# Patient Record
Sex: MALE | Race: WHITE
[De-identification: names, ages, dates, MRNs, and addresses within clinical notes are randomized per-mention and may not be internally consistent; named-entity substitution may affect disease eponyms.]

---

## 2017-10-23 ENCOUNTER — HOSPITAL ENCOUNTER (INPATIENT)
Dept: HOSPITAL 62 - ER | Age: 42
LOS: 4 days | Discharge: HOME | DRG: 330 | End: 2017-10-27
Payer: COMMERCIAL

## 2017-10-23 DIAGNOSIS — Z88.8: ICD-10-CM

## 2017-10-23 DIAGNOSIS — Z23: ICD-10-CM

## 2017-10-23 DIAGNOSIS — Z88.0: ICD-10-CM

## 2017-10-23 DIAGNOSIS — K57.20: Primary | ICD-10-CM

## 2017-10-23 DIAGNOSIS — F17.210: ICD-10-CM

## 2017-10-23 LAB
ALBUMIN SERPL-MCNC: 3.7 G/DL (ref 3.5–5)
ALP SERPL-CCNC: 61 U/L (ref 38–126)
ALT SERPL-CCNC: 31 U/L (ref 21–72)
ANION GAP SERPL CALC-SCNC: 13 MMOL/L (ref 5–19)
APPEARANCE UR: (no result)
AST SERPL-CCNC: 17 U/L (ref 17–59)
BASOPHILS # BLD AUTO: 0.1 10^3/UL (ref 0–0.2)
BASOPHILS NFR BLD AUTO: 0.6 % (ref 0–2)
BILIRUB DIRECT SERPL-MCNC: 0.2 MG/DL (ref 0–0.4)
BILIRUB SERPL-MCNC: 0.6 MG/DL (ref 0.2–1.3)
BILIRUB UR QL STRIP: NEGATIVE
BUN SERPL-MCNC: 10 MG/DL (ref 7–20)
CALCIUM: 9.2 MG/DL (ref 8.4–10.2)
CHLORIDE SERPL-SCNC: 105 MMOL/L (ref 98–107)
CO2 SERPL-SCNC: 25 MMOL/L (ref 22–30)
CREAT SERPL-MCNC: 0.89 MG/DL (ref 0.52–1.25)
EOSINOPHIL # BLD AUTO: 0.2 10^3/UL (ref 0–0.6)
EOSINOPHIL NFR BLD AUTO: 1.5 % (ref 0–6)
ERYTHROCYTE [DISTWIDTH] IN BLOOD BY AUTOMATED COUNT: 12.9 % (ref 11.5–14)
GLUCOSE SERPL-MCNC: 114 MG/DL (ref 75–110)
GLUCOSE UR STRIP-MCNC: NEGATIVE MG/DL
HCT VFR BLD CALC: 41.9 % (ref 37.9–51)
HGB BLD-MCNC: 14.7 G/DL (ref 13.5–17)
HGB HCT DIFFERENCE: 2.2
KETONES UR STRIP-MCNC: NEGATIVE MG/DL
LIPASE SERPL-CCNC: 85.7 U/L (ref 23–300)
LYMPHOCYTES # BLD AUTO: 1.9 10^3/UL (ref 0.5–4.7)
LYMPHOCYTES NFR BLD AUTO: 13.6 % (ref 13–45)
MCH RBC QN AUTO: 32 PG (ref 27–33.4)
MCHC RBC AUTO-ENTMCNC: 35 G/DL (ref 32–36)
MCV RBC AUTO: 92 FL (ref 80–97)
MONOCYTES # BLD AUTO: 1.3 10^3/UL (ref 0.1–1.4)
MONOCYTES NFR BLD AUTO: 9.3 % (ref 3–13)
NEUTROPHILS # BLD AUTO: 10.4 10^3/UL (ref 1.7–8.2)
NEUTS SEG NFR BLD AUTO: 75 % (ref 42–78)
NITRITE UR QL STRIP: NEGATIVE
PH UR STRIP: 7 [PH] (ref 5–9)
POTASSIUM SERPL-SCNC: 4.4 MMOL/L (ref 3.6–5)
PROT SERPL-MCNC: 6.6 G/DL (ref 6.3–8.2)
PROT UR STRIP-MCNC: NEGATIVE MG/DL
RBC # BLD AUTO: 4.58 10^6/UL (ref 4.35–5.55)
SODIUM SERPL-SCNC: 143.1 MMOL/L (ref 137–145)
SP GR UR STRIP: 1
UROBILINOGEN UR-MCNC: NEGATIVE MG/DL (ref ?–2)
WBC # BLD AUTO: 13.8 10^3/UL (ref 4–10.5)

## 2017-10-23 PROCEDURE — 85025 COMPLETE CBC W/AUTO DIFF WBC: CPT

## 2017-10-23 PROCEDURE — 87086 URINE CULTURE/COLONY COUNT: CPT

## 2017-10-23 PROCEDURE — 99285 EMERGENCY DEPT VISIT HI MDM: CPT

## 2017-10-23 PROCEDURE — 81001 URINALYSIS AUTO W/SCOPE: CPT

## 2017-10-23 PROCEDURE — 96360 HYDRATION IV INFUSION INIT: CPT

## 2017-10-23 PROCEDURE — 87040 BLOOD CULTURE FOR BACTERIA: CPT

## 2017-10-23 PROCEDURE — 74176 CT ABD & PELVIS W/O CONTRAST: CPT

## 2017-10-23 PROCEDURE — 88305 TISSUE EXAM BY PATHOLOGIST: CPT

## 2017-10-23 PROCEDURE — 83690 ASSAY OF LIPASE: CPT

## 2017-10-23 PROCEDURE — 80053 COMPREHEN METABOLIC PANEL: CPT

## 2017-10-23 PROCEDURE — 90686 IIV4 VACC NO PRSV 0.5 ML IM: CPT

## 2017-10-23 PROCEDURE — S0028 INJECTION, FAMOTIDINE, 20 MG: HCPCS

## 2017-10-23 PROCEDURE — 36415 COLL VENOUS BLD VENIPUNCTURE: CPT

## 2017-10-23 RX ADMIN — METRONIDAZOLE SCH ML: 500 INJECTION, SOLUTION INTRAVENOUS at 23:52

## 2017-10-23 RX ADMIN — FAMOTIDINE SCH MG: 10 INJECTION INTRAVENOUS at 22:37

## 2017-10-23 RX ADMIN — METRONIDAZOLE SCH ML: 500 INJECTION, SOLUTION INTRAVENOUS at 17:31

## 2017-10-23 RX ADMIN — CIPROFLOXACIN SCH ML: 2 INJECTION, SOLUTION INTRAVENOUS at 22:37

## 2017-10-23 NOTE — ER DOCUMENT REPORT
ED GI/





- General


Chief Complaint: Abdominal Pain


Stated Complaint: LOWER ABDOMINAL PAIN


Time Seen by Provider: 10/23/17 07:15


Mode of Arrival: Ambulatory


Information source: Patient


Notes: 





43 yo smoker, recovering etoh x 75 days, no drugs, male normally healthy c/o  

dull ache low abdominal pain with sharp episodes and bl


oating for 2 weeks.  Normal BM's. suprapubic pressure with urination. Feels 

hard rubber ball below the umbilicus. Feels like he is emptying bladder. No 

clear urine which is unusual for increased water intake. No perineal pain. No 

fever. No abd. surgeries. No vomiting, no nausea. 


TRAVEL OUTSIDE OF THE [place] IN LAST 30 DAYS: No





- Related Data


Allergies/Adverse Reactions: 


 





iodine Allergy (Verified 10/23/17 08:02)


 


Penicillins Allergy (Verified 10/23/17 08:02)


 











Past Medical History





- General


Information source: Patient





- Social History


Smoking Status: Current Every Day Smoker


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


Drug Abuse: None


Lives with: Family


Family History: Reviewed & Not Pertinent


Patient has suicidal ideation: No


Patient has homicidal ideation: No





- Medical History


Medical History: Negative


Renal/ Medical History: Denies: Hx Peritoneal Dialysis


Surgical Hx: Negative





Review of Systems





- Review of Systems


Constitutional: No symptoms reported


EENT: No symptoms reported


Cardiovascular: No symptoms reported


Respiratory: No symptoms reported


Gastrointestinal: No symptoms reported


Genitourinary: See HPI


Male Genitourinary: No symptoms reported


Musculoskeletal: No symptoms reported


Skin: No symptoms reported


Hematologic/Lymphatic: No symptoms reported


Neurological/Psychological: No symptoms reported





Physical Exam





- Vital signs


Vitals: 


 











Temp Pulse Resp BP Pulse Ox


 


 99.0 F   93   18   126/87 H  97 


 


 10/23/17 05:46  10/23/17 05:46  10/23/17 05:46  10/23/17 05:46  10/23/17 05:46











Interpretation: Normal





- General


General appearance: Appears well, Alert


In distress: None





- HEENT


Head: Normocephalic, Atraumatic


Eyes: Normal


Conjunctiva: Normal


Pupils: PERRL


Neck: Supple.  No: Lymphadenopathy





- Respiratory


Respiratory status: No respiratory distress


Chest status: Nontender


Breath sounds: Normal


Chest palpation: Normal





- Cardiovascular


Rhythm: Regular


Heart sounds: Normal auscultation


Murmur: No





- Abdominal


Inspection: Normal


Distension: No distension


Bowel sounds: Normal


Tenderness: Tender - LLQ


Organomegaly: No organomegaly.  No: Hepatomegaly, Splenomegaly





- Back


Back: Normal, Nontender.  No: CVA tenderness





- Extremities


General upper extremity: Normal inspection, Nontender, Normal color, Normal ROM

, Normal temperature


General lower extremity: Normal inspection, Nontender, Normal color, Normal ROM

, Normal temperature, Normal weight bearing.  No: Kristi's sign





- Neurological


Neuro grossly intact: Yes


Cognition: Normal


Orientation: AAOx4


Ghada Coma Scale Eye Opening: Spontaneous


Ghada Coma Scale Verbal: Oriented


Ghada Coma Scale Motor: Obeys Commands


Batson Coma Scale Total: 15


Speech: Normal


Motor strength normal: LUE, RUE, LLE, RLE


Sensory: Normal





- Psychological


Associated symptoms: Normal affect, Normal mood





- Skin


Skin Temperature: Warm


Skin Moisture: Dry


Skin Color: Normal


Skin irregularity: negative: Rash





Course





- Re-evaluation


Re-evalutation: 





10/23/17 08:40


Consult Dr. Parham and patient has a "iodide" allergies she recommended oral 

contrast instead of IV since his BMI is only 28.


10/23/17 12:02


Consult Dr. Edgardo Suero about the 2.7 x 3.2 cm fluid collection abscess and 

surrounding mesenteric inflammation sigmoid colon, diverticulitis.  He will 

admit to his service.  Patient willing to be admitted IVs fluids at 150 an hour 

and antibiotics have been ordered.


10/23/17 12:08








- Vital Signs


Vital signs: 


 











Temp Pulse Resp BP Pulse Ox


 


 99.0 F   86   16   125/78   98 


 


 10/23/17 11:32  10/23/17 11:32  10/23/17 11:32  10/23/17 11:32  10/23/17 11:32














- Laboratory


Result Diagrams: 


 10/23/17 07:52





 10/23/17 07:52


Laboratory results interpreted by me: 


 











  10/23/17 10/23/17





  07:52 07:52


 


WBC  13.8 H 


 


Absolute Neutrophils  10.4 H 


 


Glucose   114 H














Discharge





- Discharge


Clinical Impression: 


Diverticulitis of intestine with abscess


Qualifiers:


 Diverticulitis site: large intestine Diverticulitis bleeding: without bleeding 

Qualified Code(s): K57.20 - Diverticulitis of large intestine with perforation 

and abscess without bleeding





Condition: Stable


Disposition: ADMITTED AS INPATIENT


Admitting Provider: Surgicalist


Unit Admitted: Surgical Floor


Instructions:  Diverticulitis ([facility])

## 2017-10-23 NOTE — PDOC H&P
History of Present Illness


Admission Date/PCP: 


  10/23/17 12:23





  





Patient complains of: Lower abdominal pain, bloating and gaseousness.


History of Present Illness: 


SABAS HOGUE is a 42 year old male who presents with a 1-1/2 week period of 

bloating and gaseousness, that did not respond to Gas-X, laxatives, or 

cessation of his supplements and vitamins.Of bloating and gaseousness, that did 

not respond to Gas-X, laxatives, or cessation of his supplements and 

vitamins.The patient states that he began having pain, approximately 2-3 days 

ago that was 8-9 out of 10. When seen in the emergency room, he was found to 

have left lower quadrant tenderness and guarding.  However, the CT scan 

revealed a 2.7 x 3.2 cm abscess,along the sigmoid colon, That was not amenable 

to percutaneous drainage, secondary to bowel loops. Surgical referral was just 

made because of the need for sigmoid resection.








Social History


Lives with: Family


Smoking Status: Current Every Day Smoker





- Advance Directive


Resuscitation Status: Full Code





Family History


Family History: Reviewed & Not Pertinent


Parental Family History Reviewed: No


Children Family History Reviewed: No


Sibling(s) Family History Reviewed.: No





Medication/Allergy


Home Medications: 








No Home Medications  10/23/17 








Allergies/Adverse Reactions: 


 





iodine Allergy (Verified 10/23/17 08:02)


 


Penicillins Allergy (Verified 10/23/17 08:02)


 











Physical Exam


Vital Signs: 


 











Temp Pulse Resp BP Pulse Ox


 


 98.1 F   83   18   124/82   97 


 


 10/23/17 13:33  10/23/17 13:33  10/23/17 13:33  10/23/17 13:33  10/23/17 13:33











General appearance: PRESENT: no acute distress, cooperative, well-developed, 

well-nourished


Head exam: PRESENT: atraumatic, normocephalic


Eye exam: PRESENT: conjunctiva pink, EOMI, PERRLA


Mouth exam: PRESENT: moist, neck supple, tongue midline


Neck exam: PRESENT: full ROM.  ABSENT: JVD, lymphadenopathy, tenderness, 

thyromegaly, tracheal deviation


Respiratory exam: PRESENT: clear to auscultation kirby.  ABSENT: crackles, rales, 

tachypnea, unlabored, wheezes


Cardiovascular exam: PRESENT: RRR


GI/Abdominal exam: PRESENT: guarding - Mild, normal bowel sounds, soft, 

tenderness - Left lower quadrant.  ABSENT: rebound, rigid


Rectal exam: PRESENT: deferred


Neurological exam: PRESENT: alert, altered, awake, oriented to person, oriented 

to place, oriented to time, oriented to situation


Psychiatric exam: PRESENT: appropriate affect





Results


Impressions: 


 





Abdomen/Pelvis CT  10/23/17 08:37


IMPRESSION:  Diverticulitis.  Small abscess.


 














Assessment & Plan





- Plan Summary


Plan Summary: 


Given the percutaneous drainage cannot be accomplished, and given that he is 

not obstructed, we will proceed with bowel prep, IV antibiotics, and prepare 

for sigmoid resection with primary anastomosis in the morning.

## 2017-10-23 NOTE — RADIOLOGY REPORT (SQ)
EXAM DESCRIPTION:  CT ABD/PELVIS ORAL ONLY



COMPLETED DATE/TIME:  10/23/2017 11:16 am



REASON FOR STUDY:  LLQ abd pain



COMPARISON:  None.



TECHNIQUE:  CT scan of the abdomen and pelvis performed with oral contrast and no intravenous contras
t. Images reviewed with lung, soft tissue, and bone windows. Reconstructed coronal and sagittal MPR i
mages reviewed. All images stored on PACS.

All CT scanners at this facility use dose modulation, iterative reconstruction, and/or weight based d
osing when appropriate to reduce radiation dose to as low as reasonably achievable (ALARA).

[facility]: Dose Right  [facility]: CareDose    [facility]: Dose Right    [facility]: Teradose 4D    [facility]: Smart Technologies



RADIATION DOSE:  Up-to-date CT equipment and radiation dose reduction techniques were employed. CTDIv
ol: 8.8 mGy. DLP: 481 mGy-cm. mGy.



LIMITATIONS:  None.



FINDINGS:  LOWER CHEST: No significant findings. No nodules or infiltrates.

NON-CONTRASTED LIVER, SPLEEN, ADRENALS: Evaluation limited by lack of IV contrast. No identified sign
ificant masses.

PANCREAS: No masses. No peripancreatic inflammatory changes.

GALLBLADDER: No identified stones by CT criteria. No inflammatory changes to suggest cholecystitis.

RIGHT KIDNEY AND URETER: No suspicious masses. Assessment limited by lack of IV contrast.   No signif
icant calcifications.   No hydronephrosis or hydroureter.

LEFT KIDNEY AND URETER: No suspicious masses. Assessment limited by lack of IV contrast.   No signifi
cant calcifications.   No hydronephrosis or hydroureter.

AORTA AND RETROPERITONEUM: No aneurysm. No retroperitoneal masses or adenopathy.

BOWEL AND PERITONEAL CAVITY: Mesenteric inflammation sigmoid colon.  2.7 x 3.2 cm in diameter fluid c
ollection.  Due to surrounding bowel, this will be difficult to drain percutaneously.  Surgical consu
ltation is recommended.  No ascites or free air.

APPENDIX: Normal.

PELVIS, BLADDER, AND ABDOMINAL WALL: No abnormal pelvic masses. No abdominal wall hernias. Bladder un
remarkable.

BONES: No significant findings.

OTHER: No other significant finding.



IMPRESSION:  Diverticulitis.  Small abscess.



TECHNICAL DOCUMENTATION:  JOB ID:  [id number]

Quality ID # [id number]: Final reports with documentation of one or more dose reduction techniques (e.g., Au
tomated exposure control, adjustment of the mA and/or kV according to patient size, use of iterative 
reconstruction technique)

 2011 [employer]- All Rights Reserved

## 2017-10-24 PROCEDURE — 0DTN0ZZ RESECTION OF SIGMOID COLON, OPEN APPROACH: ICD-10-PCS

## 2017-10-24 RX ADMIN — MORPHINE SULFATE PRN MG: 10 INJECTION INTRAMUSCULAR; INTRAVENOUS; SUBCUTANEOUS at 22:27

## 2017-10-24 RX ADMIN — DEXTROSE, SODIUM CHLORIDE, SODIUM LACTATE, POTASSIUM CHLORIDE, AND CALCIUM CHLORIDE PRN ML: 5; .6; .31; .03; .02 INJECTION, SOLUTION INTRAVENOUS at 22:39

## 2017-10-24 RX ADMIN — METRONIDAZOLE SCH ML: 500 INJECTION, SOLUTION INTRAVENOUS at 05:33

## 2017-10-24 RX ADMIN — ENOXAPARIN SODIUM SCH MG: 40 INJECTION SUBCUTANEOUS at 09:09

## 2017-10-24 RX ADMIN — METRONIDAZOLE SCH ML: 500 INJECTION, SOLUTION INTRAVENOUS at 17:35

## 2017-10-24 RX ADMIN — CIPROFLOXACIN SCH ML: 2 INJECTION, SOLUTION INTRAVENOUS at 09:08

## 2017-10-24 RX ADMIN — FAMOTIDINE SCH MG: 10 INJECTION INTRAVENOUS at 22:28

## 2017-10-24 RX ADMIN — METRONIDAZOLE SCH ML: 500 INJECTION, SOLUTION INTRAVENOUS at 17:50

## 2017-10-24 RX ADMIN — FAMOTIDINE SCH MG: 10 INJECTION INTRAVENOUS at 09:09

## 2017-10-24 RX ADMIN — MORPHINE SULFATE PRN MG: 10 INJECTION INTRAMUSCULAR; INTRAVENOUS; SUBCUTANEOUS at 18:37

## 2017-10-24 RX ADMIN — CIPROFLOXACIN SCH ML: 2 INJECTION, SOLUTION INTRAVENOUS at 22:28

## 2017-10-24 NOTE — OPERATIVE REPORT E
Operative Report



NAME: SABAS HOGUE

MRN:  [id number]          : 1975 AGE:  42Y

DATE OF SURGERY: 10/24/2017              ROOM: [id number]



PREOPERATIVE DIAGNOSIS:

CONTAINED PERFORATED SIGMOID DIVERTICULITIS WITH ABSCESS, NOT AMENABLE TO

PERCUTANEOUS DRAINAGE BY CT GUIDED DRAINAGE.



POSTOPERATIVE DIAGNOSIS:

PERFORATED SIGMOID DIVERTICULITIS WITH SIGMOID PHLEGMON.



OPERATION:

Segmental sigmoid colectomy with primary anastomosis between the

descending colon and the sigmoid colon.



SURGEON:

COMPA HOBSON M.D.



ANESTHESIA:

General



REPLACEMENT:

Crystalloids.



DRAINS:

None.



COMPLICATIONS:

None.



CONDITION:

Stable.



FINDINGS:

Patient was admitted with contained perforated sigmoid diverticulitis with

abscess formation measuring 2.3 x 4.7 cm however at the time of surgery,

patient was found to have a phlegmon of the proximal sigmoid colon without

evidence of an abscess.



PROCEDURE:

The patient was brought to the operating room suite and placed in supine

position on the operating table.  Monitoring devices were attached.  IV

sedation was administered followed by the induction of general

endotracheal anesthesia.  The patient's abdomen was prepped and draped in

the usual sterile manner.  A timeout was achieved.  After all concurred,

the patient's abdomen was marked and a midline incision was made from the

supraumbilical region to the suprapubic region.  The incision was guided

through the skin and subcutaneous tissue down to the linea alba.  The

linea alba was divided as was the peritoneum and the abdominal cavity was

entered.  Upon entering the abdominal cavity, we noted the sigmoid

phlegmon to which a loop of small bowel was adherent and it was also

adherent to the posterior peritoneum.  We then packed away the majority of

the small bowel using laps in the upper abdomen and then we began to

dissect the phlegmon off the posterior peritoneum and dissected loops of

small bowel off the phlegmon.  We did this using blunt and sharp

dissection and significant tissue attachments were divided using the endo

ligature.  We then decided to divide the segment of the mid sigmoid from

the distal sigmoid and a RONY was used to divide the sigmoid.  We then

continued to mobilize the sigmoid and the phlegmon from the posterior

peritoneum and once this was accomplished using the LigaSure, we then

divided the proximal to distal descending colon from the proximal sigmoid

where the phlegmon had formed.  Once this was done, we then mobilized the

descending colon by incising along the white line of Toldt in order to

bring down our descending colon so that a side-to-side anastomosis could

be created between the distal sigmoid and the proximal descending colon. 

We then lined up the bowel side-to-side using 3-0 silk sutures.  Once this

was done, we made openings in each segment and after placing a bowel clamp

on the descending colon, we inserted the RONY-5 stapler creating a

side-to-side anastomosis.  We then closed the opening made by the RONY

using the TA-30 and once this was done, this was oversewn using 3-0 silk. 

The patient tolerated the procedure well.  Sponge and instrument counts

were correct.  The patient was discharged to the PACU in stable condition.













DICTATING PHYSICIAN:  COMPA HOBSON M.D.





[id number]                  DT: 10/24/2017    1641

PHY#: [id number]            DD: 10/24/2017    1448

ID:   [id number]           JOB#: [id number]       ACCT: [id number]



cc:COMPA HOBSON M.D.

>

## 2017-10-24 NOTE — BRIEF OPERATIVE NOTE
BRIEF OPERATIVE REPORT


DATE OF SURGERY: 10/24/17


TIME OF SURGERY: 12:15


PREOPERATIVE DIAGNOSIS: Contained perforated sigmoid diverticulitis with abscess

/phlegmon


POSTOPERATIVE DIAGNOSIS: Same


SURGEON: COMPA HOBSON


FINDINGS: Contained perforated sigmoid diverticulitis with abscess/phlegmon


COMPLICATIONS: 


None





ESTIMATED BLOOD LOSS: 50cc


TISSUE REMOVED OR ALTERED: Segment of sigmoid colon with abscess/phlegmon


TECHNICAL PROCEDURE: See Dictation

## 2017-10-25 LAB
BASOPHILS # BLD AUTO: 0.1 10^3/UL (ref 0–0.2)
BASOPHILS NFR BLD AUTO: 0.5 % (ref 0–2)
EOSINOPHIL # BLD AUTO: 0.1 10^3/UL (ref 0–0.6)
EOSINOPHIL NFR BLD AUTO: 1.1 % (ref 0–6)
ERYTHROCYTE [DISTWIDTH] IN BLOOD BY AUTOMATED COUNT: 12.6 % (ref 11.5–14)
HCT VFR BLD CALC: 37 % (ref 37.9–51)
HGB BLD-MCNC: 13 G/DL (ref 13.5–17)
HGB HCT DIFFERENCE: 2
LYMPHOCYTES # BLD AUTO: 2.2 10^3/UL (ref 0.5–4.7)
LYMPHOCYTES NFR BLD AUTO: 18.1 % (ref 13–45)
MCH RBC QN AUTO: 32.3 PG (ref 27–33.4)
MCHC RBC AUTO-ENTMCNC: 35.1 G/DL (ref 32–36)
MCV RBC AUTO: 92 FL (ref 80–97)
MONOCYTES # BLD AUTO: 0.6 10^3/UL (ref 0.1–1.4)
MONOCYTES NFR BLD AUTO: 5.1 % (ref 3–13)
NEUTROPHILS # BLD AUTO: 9.1 10^3/UL (ref 1.7–8.2)
NEUTS SEG NFR BLD AUTO: 75.2 % (ref 42–78)
RBC # BLD AUTO: 4.02 10^6/UL (ref 4.35–5.55)
WBC # BLD AUTO: 12.1 10^3/UL (ref 4–10.5)

## 2017-10-25 RX ADMIN — MORPHINE SULFATE PRN MG: 10 INJECTION INTRAMUSCULAR; INTRAVENOUS; SUBCUTANEOUS at 20:27

## 2017-10-25 RX ADMIN — CIPROFLOXACIN SCH ML: 2 INJECTION, SOLUTION INTRAVENOUS at 21:33

## 2017-10-25 RX ADMIN — MORPHINE SULFATE PRN MG: 10 INJECTION INTRAMUSCULAR; INTRAVENOUS; SUBCUTANEOUS at 17:30

## 2017-10-25 RX ADMIN — FAMOTIDINE SCH MG: 10 INJECTION INTRAVENOUS at 21:33

## 2017-10-25 RX ADMIN — CIPROFLOXACIN SCH ML: 2 INJECTION, SOLUTION INTRAVENOUS at 09:26

## 2017-10-25 RX ADMIN — MORPHINE SULFATE PRN MG: 10 INJECTION INTRAMUSCULAR; INTRAVENOUS; SUBCUTANEOUS at 09:26

## 2017-10-25 RX ADMIN — METRONIDAZOLE SCH ML: 500 INJECTION, SOLUTION INTRAVENOUS at 11:46

## 2017-10-25 RX ADMIN — KETOROLAC TROMETHAMINE PRN MG: 30 INJECTION, SOLUTION INTRAMUSCULAR at 21:33

## 2017-10-25 RX ADMIN — ENOXAPARIN SODIUM SCH MG: 40 INJECTION SUBCUTANEOUS at 09:26

## 2017-10-25 RX ADMIN — KETOROLAC TROMETHAMINE PRN MG: 30 INJECTION, SOLUTION INTRAMUSCULAR at 03:14

## 2017-10-25 RX ADMIN — METRONIDAZOLE SCH ML: 500 INJECTION, SOLUTION INTRAVENOUS at 01:09

## 2017-10-25 RX ADMIN — METRONIDAZOLE SCH ML: 500 INJECTION, SOLUTION INTRAVENOUS at 06:04

## 2017-10-25 RX ADMIN — METRONIDAZOLE SCH ML: 500 INJECTION, SOLUTION INTRAVENOUS at 17:30

## 2017-10-25 RX ADMIN — FAMOTIDINE SCH MG: 10 INJECTION INTRAVENOUS at 09:26

## 2017-10-25 NOTE — PDOC PROGRESS REPORT
Subjective


Progress Note for:: 10/25/17


Subjective:: 


POD 1 s/p sigmoid colectomy, patient at bedside awaiting removal of pina 

catheter, pain at surgical site. Hungry no flattus. Initiating cliq diet.





Physical Exam


Vital Signs: 


 











Temp Pulse Resp BP Pulse Ox


 


 98.1 F   81   17   113/71   96 


 


 10/25/17 00:49  10/25/17 00:49  10/25/17 00:49  10/25/17 00:49  10/25/17 00:49








 Intake & Output











 10/24/17 10/25/17 10/26/17





 06:59 06:59 06:59


 


Intake Total 5950 58553 


 


Output Total  2700 


 


Balance 5950 25138 


 


Weight 90.718 kg 90.718 kg 











General appearance: PRESENT: no acute distress, cooperative, well-developed, 

well-nourished


Head exam: PRESENT: atraumatic, normocephalic


Eye exam: PRESENT: conjunctiva pink.  ABSENT: conjunctival injection


Mouth exam: PRESENT: moist, tongue midline


Neck exam: ABSENT: lymphadenopathy, thyromegaly


Respiratory exam: PRESENT: symmetrical.  ABSENT: accessory muscle use, tachypnea


Vascular exam: PRESENT: normal capillary refill


GI/Abdominal exam: PRESENT: soft, tenderness - incisional.  ABSENT: distended, 

firm, guarding


Extremities exam: ABSENT: calf tenderness, clubbing, tenderness


Musculoskeletal exam: PRESENT: ambulatory, full ROM


Neurological exam: PRESENT: alert, awake, oriented to person, oriented to place

, oriented to time, CN II-XII grossly intact





Results


Impressions: 


 





Abdomen/Pelvis CT  10/23/17 08:37


IMPRESSION:  Diverticulitis.  Small abscess.


 














Assessment & Plan





- Diagnosis


(1) S/P partial colectomy


Is this a current diagnosis for this admission?: Yes   


Plan: 


Cliq diet, advance diet with bowel function


Ambulation


Pain control


Pulmonary toilet, IS ordered


DVT/GI prophylaxis

## 2017-10-26 LAB
BASOPHILS # BLD AUTO: 0.1 10^3/UL (ref 0–0.2)
BASOPHILS NFR BLD AUTO: 0.9 % (ref 0–2)
EOSINOPHIL # BLD AUTO: 0.3 10^3/UL (ref 0–0.6)
EOSINOPHIL NFR BLD AUTO: 2.7 % (ref 0–6)
ERYTHROCYTE [DISTWIDTH] IN BLOOD BY AUTOMATED COUNT: 12.8 % (ref 11.5–14)
HCT VFR BLD CALC: 35.3 % (ref 37.9–51)
HGB BLD-MCNC: 12.4 G/DL (ref 13.5–17)
HGB HCT DIFFERENCE: 1.9
LYMPHOCYTES # BLD AUTO: 2.1 10^3/UL (ref 0.5–4.7)
LYMPHOCYTES NFR BLD AUTO: 22.6 % (ref 13–45)
MCH RBC QN AUTO: 31.7 PG (ref 27–33.4)
MCHC RBC AUTO-ENTMCNC: 35 G/DL (ref 32–36)
MCV RBC AUTO: 91 FL (ref 80–97)
MONOCYTES # BLD AUTO: 0.8 10^3/UL (ref 0.1–1.4)
MONOCYTES NFR BLD AUTO: 8.1 % (ref 3–13)
NEUTROPHILS # BLD AUTO: 6.1 10^3/UL (ref 1.7–8.2)
NEUTS SEG NFR BLD AUTO: 65.7 % (ref 42–78)
RBC # BLD AUTO: 3.9 10^6/UL (ref 4.35–5.55)
WBC # BLD AUTO: 9.3 10^3/UL (ref 4–10.5)

## 2017-10-26 RX ADMIN — METRONIDAZOLE SCH ML: 500 INJECTION, SOLUTION INTRAVENOUS at 00:22

## 2017-10-26 RX ADMIN — DEXTROSE, SODIUM CHLORIDE, SODIUM LACTATE, POTASSIUM CHLORIDE, AND CALCIUM CHLORIDE PRN ML: 5; .6; .31; .03; .02 INJECTION, SOLUTION INTRAVENOUS at 04:06

## 2017-10-26 RX ADMIN — FAMOTIDINE SCH MG: 10 INJECTION INTRAVENOUS at 21:09

## 2017-10-26 RX ADMIN — MORPHINE SULFATE PRN MG: 10 INJECTION INTRAMUSCULAR; INTRAVENOUS; SUBCUTANEOUS at 07:05

## 2017-10-26 RX ADMIN — CIPROFLOXACIN SCH ML: 2 INJECTION, SOLUTION INTRAVENOUS at 21:09

## 2017-10-26 RX ADMIN — KETOROLAC TROMETHAMINE PRN MG: 30 INJECTION, SOLUTION INTRAMUSCULAR at 21:08

## 2017-10-26 RX ADMIN — MORPHINE SULFATE PRN MG: 10 INJECTION INTRAMUSCULAR; INTRAVENOUS; SUBCUTANEOUS at 18:20

## 2017-10-26 RX ADMIN — MORPHINE SULFATE PRN MG: 10 INJECTION INTRAMUSCULAR; INTRAVENOUS; SUBCUTANEOUS at 14:35

## 2017-10-26 RX ADMIN — ENOXAPARIN SODIUM SCH MG: 40 INJECTION SUBCUTANEOUS at 09:19

## 2017-10-26 RX ADMIN — DEXTROSE, SODIUM CHLORIDE, SODIUM LACTATE, POTASSIUM CHLORIDE, AND CALCIUM CHLORIDE PRN ML: 5; .6; .31; .03; .02 INJECTION, SOLUTION INTRAVENOUS at 17:07

## 2017-10-26 RX ADMIN — METRONIDAZOLE SCH ML: 500 INJECTION, SOLUTION INTRAVENOUS at 17:06

## 2017-10-26 RX ADMIN — METRONIDAZOLE SCH ML: 500 INJECTION, SOLUTION INTRAVENOUS at 05:41

## 2017-10-26 RX ADMIN — METRONIDAZOLE SCH ML: 500 INJECTION, SOLUTION INTRAVENOUS at 11:26

## 2017-10-26 RX ADMIN — CIPROFLOXACIN SCH ML: 2 INJECTION, SOLUTION INTRAVENOUS at 09:23

## 2017-10-26 RX ADMIN — KETOROLAC TROMETHAMINE PRN MG: 30 INJECTION, SOLUTION INTRAMUSCULAR at 09:23

## 2017-10-26 RX ADMIN — FAMOTIDINE SCH MG: 10 INJECTION INTRAVENOUS at 09:22

## 2017-10-26 NOTE — PDOC PROGRESS REPORT
Subjective


Progress Note for:: 10/26/17


Subjective:: 





No events overnight


Pain controlled


Tolerating a liquid diet, adding po pain meds


awaiting bowel function


Patient ambulatory after pina out yesturday





Physical Exam


Vital Signs: 


 











Temp Pulse Resp BP Pulse Ox


 


 97.6 F   76   18   114/72   96 


 


 10/25/17 23:33  10/25/17 23:33  10/25/17 23:33  10/25/17 23:33  10/25/17 23:33








 Intake & Output











 10/25/17 10/26/17 10/27/17





 06:59 06:59 06:59


 


Intake Total 29154 4435 


 


Output Total 2700 600 


 


Balance 53769 3835 


 


Weight 90.718 kg 94.8 kg 











General appearance: PRESENT: no acute distress, well-developed, well-nourished


Head exam: PRESENT: atraumatic, normocephalic


Eye exam: PRESENT: conjunctiva pink


Mouth exam: PRESENT: moist


Neck exam: ABSENT: lymphadenopathy, thyromegaly, tracheal deviation


Respiratory exam: PRESENT: symmetrical, unlabored.  ABSENT: tachypnea


Vascular exam: PRESENT: normal capillary refill


GI/Abdominal exam: PRESENT: soft, tenderness - Incisional, incision C/D/I.  

ABSENT: distended, firm


Extremities exam: ABSENT: calf tenderness, tenderness


Neurological exam: PRESENT: alert, awake, oriented to person, oriented to place

, oriented to time, CN II-XII grossly intact





Results


Laboratory Results: 


 





 10/25/17 10:44 





 











  10/25/17





  10:44


 


WBC  12.1 H


 


RBC  4.02 L


 


Hgb  13.0 L


 


Hct  37.0 L


 


MCV  92


 


MCH  32.3


 


MCHC  35.1


 


RDW  12.6


 


Plt Count  195


 


Seg Neutrophils %  75.2


 


Lymphocytes %  18.1


 


Monocytes %  5.1


 


Eosinophils %  1.1


 


Basophils %  0.5


 


Absolute Neutrophils  9.1 H


 


Absolute Lymphocytes  2.2


 


Absolute Monocytes  0.6


 


Absolute Eosinophils  0.1


 


Absolute Basophils  0.1











Impressions: 


 





Abdomen/Pelvis CT  10/23/17 08:37


IMPRESSION:  Diverticulitis.  Small abscess.


 














Assessment & Plan





- Diagnosis


(1) S/P partial colectomy


Is this a current diagnosis for this admission?: Yes   


Plan: 


Cliq diet, maintain, awaiting bowel function


Ambulation


Pain control, adding oral pain meds


Pulmonary toilet, using IS 10x/hr


DVT/GI prophylaxis

## 2017-10-27 VITALS — SYSTOLIC BLOOD PRESSURE: 137 MMHG | DIASTOLIC BLOOD PRESSURE: 82 MMHG

## 2017-10-27 LAB
BASOPHILS # BLD AUTO: 0.1 10^3/UL (ref 0–0.2)
BASOPHILS NFR BLD AUTO: 0.8 % (ref 0–2)
EOSINOPHIL # BLD AUTO: 0.3 10^3/UL (ref 0–0.6)
EOSINOPHIL NFR BLD AUTO: 3.3 % (ref 0–6)
ERYTHROCYTE [DISTWIDTH] IN BLOOD BY AUTOMATED COUNT: 13 % (ref 11.5–14)
HCT VFR BLD CALC: 37.3 % (ref 37.9–51)
HGB BLD-MCNC: 12.9 G/DL (ref 13.5–17)
HGB HCT DIFFERENCE: 1.4
LYMPHOCYTES # BLD AUTO: 1.5 10^3/UL (ref 0.5–4.7)
LYMPHOCYTES NFR BLD AUTO: 17.5 % (ref 13–45)
MCH RBC QN AUTO: 31.3 PG (ref 27–33.4)
MCHC RBC AUTO-ENTMCNC: 34.5 G/DL (ref 32–36)
MCV RBC AUTO: 91 FL (ref 80–97)
MONOCYTES # BLD AUTO: 0.8 10^3/UL (ref 0.1–1.4)
MONOCYTES NFR BLD AUTO: 8.8 % (ref 3–13)
NEUTROPHILS # BLD AUTO: 6 10^3/UL (ref 1.7–8.2)
NEUTS SEG NFR BLD AUTO: 69.6 % (ref 42–78)
RBC # BLD AUTO: 4.1 10^6/UL (ref 4.35–5.55)
WBC # BLD AUTO: 8.7 10^3/UL (ref 4–10.5)

## 2017-10-27 PROCEDURE — 3E0234Z INTRODUCTION OF SERUM, TOXOID AND VACCINE INTO MUSCLE, PERCUTANEOUS APPROACH: ICD-10-PCS

## 2017-10-27 RX ADMIN — ENOXAPARIN SODIUM SCH MG: 40 INJECTION SUBCUTANEOUS at 10:17

## 2017-10-27 RX ADMIN — KETOROLAC TROMETHAMINE PRN MG: 30 INJECTION, SOLUTION INTRAMUSCULAR at 05:18

## 2017-10-27 RX ADMIN — METRONIDAZOLE SCH ML: 500 INJECTION, SOLUTION INTRAVENOUS at 00:57

## 2017-10-27 RX ADMIN — METRONIDAZOLE SCH ML: 500 INJECTION, SOLUTION INTRAVENOUS at 05:18

## 2017-10-27 RX ADMIN — CIPROFLOXACIN SCH ML: 2 INJECTION, SOLUTION INTRAVENOUS at 10:14

## 2017-10-27 RX ADMIN — METRONIDAZOLE SCH ML: 500 INJECTION, SOLUTION INTRAVENOUS at 12:34

## 2017-10-27 RX ADMIN — DEXTROSE, SODIUM CHLORIDE, SODIUM LACTATE, POTASSIUM CHLORIDE, AND CALCIUM CHLORIDE PRN ML: 5; .6; .31; .03; .02 INJECTION, SOLUTION INTRAVENOUS at 05:04

## 2017-10-27 RX ADMIN — FAMOTIDINE SCH MG: 10 INJECTION INTRAVENOUS at 10:13

## 2017-10-27 NOTE — PDOC PROGRESS REPORT
Subjective


Progress Note for:: 10/27/17


Subjective:: 


Patient tolerating a diet


Minimal pain associated with incision


Passing flattus/BM per patient





Physical Exam


Vital Signs: 


 











Temp Pulse Resp BP Pulse Ox


 


 97.3 F   62   14   140/89 H  98 


 


 10/27/17 07:20  10/27/17 07:20  10/27/17 07:20  10/27/17 07:20  10/27/17 07:20








 Intake & Output











 10/26/17 10/27/17 10/28/17





 06:59 06:59 06:59


 


Intake Total 4435 4885 


 


Output Total 600  


 


Balance 3835 4885 


 


Weight 94.8 kg 96.3 kg 











General appearance: PRESENT: no acute distress


Head exam: PRESENT: atraumatic, normocephalic


Eye exam: PRESENT: conjunctiva pink


Mouth exam: PRESENT: moist, neck supple


Neck exam: ABSENT: lymphadenopathy, tenderness, thyromegaly


Respiratory exam: PRESENT: symmetrical, unlabored.  ABSENT: tachypnea


Vascular exam: PRESENT: normal capillary refill


GI/Abdominal exam: PRESENT: soft, tenderness - incisional pain.  ABSENT: 

distended, firm


Extremities exam: ABSENT: calf tenderness, pedal edema


Neurological exam: PRESENT: alert, awake, oriented to person, oriented to place

, oriented to time, CN II-XII grossly intact





Results


Laboratory Results: 


 





 10/26/17 10:23 





 











  10/26/17





  10:23


 


WBC  9.3


 


RBC  3.90 L


 


Hgb  12.4 L


 


Hct  35.3 L


 


MCV  91


 


MCH  31.7


 


MCHC  35.0


 


RDW  12.8


 


Plt Count  197


 


Seg Neutrophils %  65.7


 


Lymphocytes %  22.6


 


Monocytes %  8.1


 


Eosinophils %  2.7


 


Basophils %  0.9


 


Absolute Neutrophils  6.1


 


Absolute Lymphocytes  2.1


 


Absolute Monocytes  0.8


 


Absolute Eosinophils  0.3


 


Absolute Basophils  0.1











Impressions: 


 





Abdomen/Pelvis CT  10/23/17 08:37


IMPRESSION:  Diverticulitis.  Small abscess.


 














Assessment & Plan





- Diagnosis


(1) S/P partial colectomy


Is this a current diagnosis for this admission?: Yes   


Plan: 


Advance diet


With tolerance home today


Home on oral antibiotics

## 2017-10-27 NOTE — PDOC DISCHARGE SUMMARY
Discharge Summary (SDC)





- Discharge


Final Diagnosis: 


Diverticulitis, s/p sigmoidectomy


Date of Surgery: 10/24/17


Discharge Date: 10/27/17


Condition: Good


Forms:  Post Operative


Treatment or Instructions: 


Fiber supplementation of diet, 7-10g daily to help with diverticulosis 

development and prevent diverticulitis


Oral antibiotic course for 14 days


Prescriptions: 


Ciprofloxacin HCl [Cipro 500 mg Tablet] 500 mg PO BID #20 tablet


Hydrocodone/Acetaminophen [Norco 5-325 mg Tablet] 2 tab PO Q6HP PRN #20 tablet


 PRN Reason: 


Metronidazole [Flagyl 500 mg Tablet] 500 mg PO TID #30 tablet


Referrals: 


[facility] [Provider Group]


Respiratory Treatments at Home: Deep Breathing/Coughing, Incentive Spirometer


Discharge Activity: No Lifting Over 10 Pounds - x4 weeks


Home Care Assistance: None Needed


Report the Following to Your Physician Immediately: Shortness of Breath, Nausea

, Vomiting, Increase in Pain, Signs of Hyperglycemia, Signs of Hypoglycemia, 

Yellow Skin, Fever over 101 Degrees, Unusual Bleeding, Redness, Swelling, Warmth

, Increased Soreness, Drainage-Yellow, Drainage-Gray, Drainage-Green, Drainage-

Foul Smelling, Increased Vaginal Bleed, Large Clots, Numbness, Tingling 

Sensation, Visual Disturbance, Weight Gain 2-3lbs a day, Weight Gain 3-5lbs a 

week, Wheezing, Seizure, IV Site Infection Signs, Urinary Infection Signs

## 2017-12-01 NOTE — PDOC DISCHARGE SUMMARY
General





- Admit/Disc Date/PCP


Admission Date/Primary Care Provider: 


  10/23/17 14:38





  





Discharge Date: 10/27/17





- Discharge Diagnosis


(1) S/P partial colectomy


Is this a current diagnosis for this admission?: Yes   





- Additional Information


Resuscitation Status: Full Code


Discharge Diet: Other (Comments)


Discharge Activity: No Lifting Over 10 Pounds


Home Medications: 








Ciprofloxacin HCl [Cipro 500 mg Tablet] 500 mg PO BID #20 tablet 10/27/17 


Hydrocodone/Acetaminophen [Norco 5-325 mg Tablet] 2 tab PO Q6HP PRN #20 tablet 

10/27/17 


Metronidazole [Flagyl 500 mg Tablet] 500 mg PO TID #30 tablet 10/27/17 











History of Present Illness


Patient complains of: Abdominal pain


History of Present Illness: 


SABAS HOGUE is a 42 year old male admitted on 10/23/17 for contained sigmoid 

perforation. Innability to percutaneously drain the site drove decision to 

perform sigmoid colectomy by Dr Alarcon. Patient was hospitalized postoperatively 

for recovery after sigmoidectomy. Uneventful postoperative course with 

progression of tolerance of diet and pain control allowed patient discharge 

home on POD 5. At discharge patient was stable. Instructions were given to the 

patient for weight restriction of 10-lbs or less, pain control options 

including narcotics orally. Instructions were given to the patient to call for 

followup with general surgery at [facility] for follow up in 7-10 

days after discharge home.





Final diagnosis was sigmoid diverticulitis s/p sigmoidectomy with primary 

anastamosis.





Hospital Course


Hospital Course: 


Uneventful hospital course. Progression and recovery as anticipated.





Physical Exam


Vital Signs: 


 











Temp Pulse Resp BP Pulse Ox


 


 97.5 F   75   16   137/82 H  98 


 


 10/27/17 12:51  10/27/17 12:51  10/27/17 12:51  10/27/17 12:51  10/27/17 12:51











General appearance: PRESENT: no acute distress, well-developed, well-nourished


Head exam: PRESENT: atraumatic, normocephalic


Eye exam: PRESENT: conjunctiva pink, EOMI, PERRLA.  ABSENT: scleral icterus


Ear exam: PRESENT: normal external ear exam


Mouth exam: PRESENT: moist, tongue midline


Neck exam: ABSENT: carotid bruit, JVD, lymphadenopathy, thyromegaly


Respiratory exam: PRESENT: clear to auscultation kirby.  ABSENT: rales, rhonchi, 

wheezes


Cardiovascular exam: PRESENT: RRR.  ABSENT: diastolic murmur, rubs, systolic 

murmur


Pulses: PRESENT: normal dorsalis pedis pul


Vascular exam: PRESENT: normal capillary refill


GI/Abdominal exam: PRESENT: normal bowel sounds, soft.  ABSENT: distended, 

guarding, mass, organolmegaly, rebound, tenderness


Rectal exam: PRESENT: deferred


Extremities exam: PRESENT: full ROM.  ABSENT: calf tenderness, clubbing, pedal 

edema


Neurological exam: PRESENT: alert, awake, oriented to person, oriented to place

, oriented to time, oriented to situation, CN II-XII grossly intact.  ABSENT: 

motor sensory deficit


Psychiatric exam: PRESENT: appropriate affect, normal mood.  ABSENT: homicidal 

ideation, suicidal ideation


Skin exam: PRESENT: dry, intact, warm.  ABSENT: cyanosis, rash





Results


Laboratory Results: 


 





 10/27/17 10:23 








Impressions: 


 





Abdomen/Pelvis CT  10/23/17 08:37


IMPRESSION:  Diverticulitis.  Small abscess.


 














Qualifiers


**PATEINT BEING DISCHARGED WITH ANY OF THE FOLLOWING DIAGNOSIS?: No





Plan


Discharge Plan: 


Discharge home with short term follow up with General surgery outpatient.

## 2018-01-02 ENCOUNTER — HOSPITAL ENCOUNTER (OUTPATIENT)
Dept: HOSPITAL 62 - RAD | Age: 43
End: 2018-01-02
Attending: PHYSICIAN ASSISTANT
Payer: COMMERCIAL

## 2018-01-02 DIAGNOSIS — Z98.890: ICD-10-CM

## 2018-01-02 DIAGNOSIS — Z87.19: ICD-10-CM

## 2018-01-02 DIAGNOSIS — R10.9: Primary | ICD-10-CM

## 2018-01-02 PROCEDURE — 74176 CT ABD & PELVIS W/O CONTRAST: CPT

## 2018-01-02 NOTE — RADIOLOGY REPORT (SQ)
EXAM DESCRIPTION:  CT ABD/PELVIS ORAL ONLY



COMPLETED DATE/TIME:  1/2/2018 2:03 pm



REASON FOR STUDY:  R10.9 UNSPECIFIED ABDOMINAL PAIN R10.9  UNSPECIFIED ABDOMINAL PAIN



COMPARISON:  10/23/2017



TECHNIQUE:  CT scan of the abdomen and pelvis performed with oral contrast and no intravenous contras
t. Images reviewed with lung, soft tissue, and bone windows. Reconstructed coronal and sagittal MPR i
mages reviewed. All images stored on PACS.

All CT scanners at this facility use dose modulation, iterative reconstruction, and/or weight based d
osing when appropriate to reduce radiation dose to as low as reasonably achievable (ALARA).

[facility]: Dose Right  [facility]: CareDose    [facility]: Dose Right    [facility]: Teradose 4D    [facility]: Smart Technologies



RADIATION DOSE:  CT Rad equipment meets quality standard of care and radiation dose reduction techniq
ues were employed. CTDIvol: 6.5 mGy. DLP: 352 mGy-cm. mGy.



LIMITATIONS:  None.



FINDINGS:  LOWER CHEST: No significant findings. No nodules or infiltrates.

NON-CONTRASTED LIVER, SPLEEN, ADRENALS: Evaluation limited by lack of IV contrast. No identified sign
ificant masses.

PANCREAS: No masses. No peripancreatic inflammatory changes.

GALLBLADDER: No identified stones by CT criteria. No inflammatory changes to suggest cholecystitis.

RIGHT KIDNEY AND URETER: No suspicious masses. Assessment limited by lack of IV contrast.   No signif
icant calcifications.   No hydronephrosis or hydroureter.

LEFT KIDNEY AND URETER: No suspicious masses. Assessment limited by lack of IV contrast.   No signifi
cant calcifications.   No hydronephrosis or hydroureter.

AORTA AND RETROPERITONEUM: No aneurysm. No retroperitoneal masses or adenopathy.

BOWEL AND PERITONEAL CAVITY: Anastomosis sigmoid colon.  No obvious masses or inflammatory changes. N
o free fluid.

APPENDIX: Normal.

PELVIS, BLADDER, AND ABDOMINAL WALL: No abnormal pelvic masses. No abdominal wall hernias. Bladder un
remarkable.

BONES: No significant findings.

OTHER: No other significant finding.



IMPRESSION:  No acute findings in the abdomen or pelvis.



TECHNICAL DOCUMENTATION:  JOB ID:  [id number]

Quality ID # 436: Final reports with documentation of one or more dose reduction techniques (e.g., Au
tomated exposure control, adjustment of the mA and/or kV according to patient size, use of iterative 
reconstruction technique)

 2011 [employer]- All Rights Reserved

## 2018-01-05 ENCOUNTER — HOSPITAL ENCOUNTER (OUTPATIENT)
Dept: HOSPITAL 62 - LAB | Age: 43
End: 2018-01-05
Attending: FAMILY MEDICINE
Payer: COMMERCIAL

## 2018-01-05 DIAGNOSIS — M25.50: Primary | ICD-10-CM

## 2018-01-05 LAB
ADD MANUAL DIFF: NO
ALBUMIN SERPL-MCNC: 3.7 G/DL (ref 3.5–5)
ALP SERPL-CCNC: 70 U/L (ref 38–126)
ALT SERPL-CCNC: 33 U/L (ref 21–72)
ANION GAP SERPL CALC-SCNC: 9 MMOL/L (ref 5–19)
AST SERPL-CCNC: 17 U/L (ref 17–59)
BASOPHILS # BLD AUTO: 0.1 10^3/UL (ref 0–0.2)
BASOPHILS NFR BLD AUTO: 0.7 % (ref 0–2)
BILIRUB DIRECT SERPL-MCNC: 0.2 MG/DL (ref 0–0.4)
BILIRUB SERPL-MCNC: 0.3 MG/DL (ref 0.2–1.3)
BUN SERPL-MCNC: 17 MG/DL (ref 7–20)
CALCIUM: 9.8 MG/DL (ref 8.4–10.2)
CHLORIDE SERPL-SCNC: 106 MMOL/L (ref 98–107)
CK SERPL-CCNC: 80 U/L (ref 55–170)
CO2 SERPL-SCNC: 25 MMOL/L (ref 22–30)
CRP SERPL-MCNC: 58.9 MG/L (ref ?–10)
EOSINOPHIL # BLD AUTO: 0.5 10^3/UL (ref 0–0.6)
EOSINOPHIL NFR BLD AUTO: 6.4 % (ref 0–6)
ERYTHROCYTE [DISTWIDTH] IN BLOOD BY AUTOMATED COUNT: 13.8 % (ref 11.5–14)
ERYTHROCYTE [SEDIMENTATION RATE] IN BLOOD: 55 MM/HR (ref 0–15)
GLUCOSE SERPL-MCNC: 98 MG/DL (ref 75–110)
HCT VFR BLD CALC: 41.8 % (ref 37.9–51)
HGB BLD-MCNC: 14.4 G/DL (ref 13.5–17)
LYMPHOCYTES # BLD AUTO: 1.7 10^3/UL (ref 0.5–4.7)
LYMPHOCYTES NFR BLD AUTO: 20.4 % (ref 13–45)
MAGNESIUM SERPL-MCNC: 1.8 MG/DL (ref 1.6–2.3)
MCH RBC QN AUTO: 30.7 PG (ref 27–33.4)
MCHC RBC AUTO-ENTMCNC: 34.4 G/DL (ref 32–36)
MCV RBC AUTO: 89 FL (ref 80–97)
MONOCYTES # BLD AUTO: 0.9 10^3/UL (ref 0.1–1.4)
MONOCYTES NFR BLD AUTO: 10.5 % (ref 3–13)
NEUTROPHILS # BLD AUTO: 5.2 10^3/UL (ref 1.7–8.2)
NEUTS SEG NFR BLD AUTO: 62 % (ref 42–78)
PLATELET # BLD: 197 10^3/UL (ref 150–450)
POTASSIUM SERPL-SCNC: 4.1 MMOL/L (ref 3.6–5)
PROT SERPL-MCNC: 6.4 G/DL (ref 6.3–8.2)
RBC # BLD AUTO: 4.68 10^6/UL (ref 4.35–5.55)
SODIUM SERPL-SCNC: 140.2 MMOL/L (ref 137–145)
TOTAL CELLS COUNTED % (AUTO): 100 %
VIT B12 SERPL-MCNC: 898 PG/ML (ref 239–931)
WBC # BLD AUTO: 8.5 10^3/UL (ref 4–10.5)

## 2018-01-05 PROCEDURE — 82607 VITAMIN B-12: CPT

## 2018-01-05 PROCEDURE — 36415 COLL VENOUS BLD VENIPUNCTURE: CPT

## 2018-01-05 PROCEDURE — 82550 ASSAY OF CK (CPK): CPT

## 2018-01-05 PROCEDURE — 86617 LYME DISEASE ANTIBODY: CPT

## 2018-01-05 PROCEDURE — 86701 HIV-1ANTIBODY: CPT

## 2018-01-05 PROCEDURE — 86140 C-REACTIVE PROTEIN: CPT

## 2018-01-05 PROCEDURE — 86038 ANTINUCLEAR ANTIBODIES: CPT

## 2018-01-05 PROCEDURE — 86430 RHEUMATOID FACTOR TEST QUAL: CPT

## 2018-01-05 PROCEDURE — 86644 CMV ANTIBODY: CPT

## 2018-01-05 PROCEDURE — 85652 RBC SED RATE AUTOMATED: CPT

## 2018-01-05 PROCEDURE — 80053 COMPREHEN METABOLIC PANEL: CPT

## 2018-01-05 PROCEDURE — 84443 ASSAY THYROID STIM HORMONE: CPT

## 2018-01-05 PROCEDURE — 82085 ASSAY OF ALDOLASE: CPT

## 2018-01-05 PROCEDURE — 86747 PARVOVIRUS ANTIBODY: CPT

## 2018-01-05 PROCEDURE — 86757 RICKETTSIA ANTIBODY: CPT

## 2018-01-05 PROCEDURE — 82306 VITAMIN D 25 HYDROXY: CPT

## 2018-01-05 PROCEDURE — 82746 ASSAY OF FOLIC ACID SERUM: CPT

## 2018-01-05 PROCEDURE — 86618 LYME DISEASE ANTIBODY: CPT

## 2018-01-05 PROCEDURE — 83735 ASSAY OF MAGNESIUM: CPT

## 2018-01-05 PROCEDURE — 85025 COMPLETE CBC W/AUTO DIFF WBC: CPT

## 2018-01-08 LAB
25(OH)D3 SERPL-MCNC: 32.3 NG/ML (ref 30–100)
ALDOLASE: 4.5 U/L (ref 3.3–10.3)
CMV IGG SERPL IA-ACNC: <0.6 U/ML (ref 0–0.59)
CYTOMEGALOVIRUS IGM AB: <30 AU/ML (ref 0–29.9)

## 2018-06-18 LAB
ANION GAP SERPL CALC-SCNC: 10 MMOL/L (ref 5–19)
BUN SERPL-MCNC: 12 MG/DL (ref 7–20)
CALCIUM: 9.7 MG/DL (ref 8.4–10.2)
CHLORIDE SERPL-SCNC: 105 MMOL/L (ref 98–107)
CO2 SERPL-SCNC: 29 MMOL/L (ref 22–30)
ERYTHROCYTE [DISTWIDTH] IN BLOOD BY AUTOMATED COUNT: 13.5 % (ref 11.5–14)
GLUCOSE SERPL-MCNC: 93 MG/DL (ref 75–110)
HCT VFR BLD CALC: 44.2 % (ref 37.9–51)
HGB BLD-MCNC: 15.2 G/DL (ref 13.5–17)
MCH RBC QN AUTO: 31.3 PG (ref 27–33.4)
MCHC RBC AUTO-ENTMCNC: 34.4 G/DL (ref 32–36)
MCV RBC AUTO: 91 FL (ref 80–97)
PLATELET # BLD: 151 10^3/UL (ref 150–450)
POTASSIUM SERPL-SCNC: 4.4 MMOL/L (ref 3.6–5)
RBC # BLD AUTO: 4.87 10^6/UL (ref 4.35–5.55)
SODIUM SERPL-SCNC: 144.1 MMOL/L (ref 137–145)
WBC # BLD AUTO: 6.4 10^3/UL (ref 4–10.5)

## 2018-06-22 ENCOUNTER — HOSPITAL ENCOUNTER (OUTPATIENT)
Dept: HOSPITAL 62 - OROUT | Age: 43
Discharge: HOME | End: 2018-06-22
Attending: SURGERY
Payer: COMMERCIAL

## 2018-06-22 VITALS — SYSTOLIC BLOOD PRESSURE: 126 MMHG | DIASTOLIC BLOOD PRESSURE: 82 MMHG

## 2018-06-22 DIAGNOSIS — Z91.041: ICD-10-CM

## 2018-06-22 DIAGNOSIS — M10.9: ICD-10-CM

## 2018-06-22 DIAGNOSIS — Z88.0: ICD-10-CM

## 2018-06-22 DIAGNOSIS — K43.9: Primary | ICD-10-CM

## 2018-06-22 DIAGNOSIS — Z79.82: ICD-10-CM

## 2018-06-22 DIAGNOSIS — F17.210: ICD-10-CM

## 2018-06-22 PROCEDURE — S0119 ONDANSETRON 4 MG: HCPCS

## 2018-06-22 PROCEDURE — 85027 COMPLETE CBC AUTOMATED: CPT

## 2018-06-22 PROCEDURE — 80048 BASIC METABOLIC PNL TOTAL CA: CPT

## 2018-06-22 PROCEDURE — 49652: CPT

## 2018-06-22 PROCEDURE — C1781 MESH (IMPLANTABLE): HCPCS

## 2018-06-22 PROCEDURE — 36415 COLL VENOUS BLD VENIPUNCTURE: CPT

## 2018-06-22 NOTE — OPERATIVE REPORT
Nonrecallable Operative Report


DATE OF SURGERY: 06/22/18


PREOPERATIVE DIAGNOSIS: incisional ventral hernia


POSTOPERATIVE DIAGNOSIS: Ventral, incisional hernia 2


OPERATION: Robot-assisted laparoscopic ventral hernia repair with mesh


SURGEON: DORA BAILEY


ANESTHESIA: GA


TISSUE REMOVED OR ALTERED: None


COMPLICATIONS: 





None apparent


ESTIMATED BLOOD LOSS: Minimal


PROCEDURE: 





Drains/implants: 20 x 15 cm Ventra lite ST hernia mesh.





Procedure in detail: After informed consent was obtained, the patient was laid 

in the supine position in the operating room.  The area of the abdomen was 

prepped and draped in a normal, sterile fashion.  A 5 mm trocar was introduced 

into the left upper quadrant using a 5 mm camera and the Optiview technique.  

Once the trocar was inserted into the abdominal cavity, gas insufflation was 

attached and pneumoperitoneum was achieved.  Next, a 12 mm left lateral trocar 

was placed under direct laparoscopic visualization.  Another 8 mm left lower 

quadrant trocar was placed under direct laparoscopic visualization.  The 5 mm 

trocar was removed and replaced with an 8 mm left upper quadrant trocar.  The 

robot was then brought over the patient and docked appropriately.  I then 

assumed my position at the surgeon's console.





The abdomen was inspected.  There were 2 ventral hernia defects present in the 

supraumbilical position.  One was approximately 5 cm in total diameter.  The 

more superior defect was smaller.  Overall distance of the 2 defects together 

was approximately 10 cm.  Secondary to this, a 20 cm long mesh was chosen to 

adequately cover the defect.  The omentum was freed from the anterior abdominal 

wall using a mixture of sharp dissection and Bovie electrocautery.  The 

falciform ligament was taken down using sharp dissection and Bovie 

electrocautery.  Next, the midline fascia was closed using #1 permanent V lock 

suture in simple running fashion 2.  The 20 x 15 cm ventral light ST hernia 

mesh was then inserted into the abdomen and apposed to the anterior abdominal 

wall.  The mesh was sutured to the anterior abdominal wall using 2 -0 permanent 

V lock suture in simple running fashion.  Once this was complete, the repair 

was inspected.  It was found to be in good order.  Next, attention was turned 

to closure of the port sites.





All 3 port sites were closed using 0 Vicryl suture and the Endo Close device in 

simple interrupted fashion.  Once this was complete, pneumoperitoneum was 

relieved.  The overlying skin was closed using 4-0 Vicryl Rapide suture in 

subcuticular fashion.  Dressings were placed, and the procedure was concluded.  

All sponge, instrument, and needle counts were correct 2.





Condition: Stable.

## 2018-06-22 NOTE — DISCHARGE SUMMARY
Discharge Summary (SDC)





- Discharge


Final Diagnosis: 





ventral hernia


Date of Surgery: 06/22/18


Discharge Date: 06/22/18


Condition: Stable


Referrals: 


RAINA OLIVERA MD [Primary Care Provider] - 


Discharge Diet: As Tolerated


Respiratory Treatments at Home: Deep Breathing/Coughing, Incentive Spirometer


Discharge Activity: No Lifting Over 10 Pounds


Home Care Assistance: None Needed


Report the Following to Your Physician Immediately: Shortness of Breath, Nausea

, Vomiting, Increase in Pain, Fever over 101 Degrees, Unusual Bleeding, Swelling

, Warmth, Increased Soreness

## 2019-06-06 ENCOUNTER — HOSPITAL ENCOUNTER (OUTPATIENT)
Dept: HOSPITAL 62 - RAD | Age: 44
End: 2019-06-06
Attending: SURGERY
Payer: COMMERCIAL

## 2019-06-06 DIAGNOSIS — R10.9: Primary | ICD-10-CM

## 2019-06-06 DIAGNOSIS — Z90.49: ICD-10-CM

## 2019-06-06 PROCEDURE — 74176 CT ABD & PELVIS W/O CONTRAST: CPT

## 2019-06-06 NOTE — RADIOLOGY REPORT (SQ)
EXAM DESCRIPTION:  CT ABD/PELVIS NO ORAL OR IV



COMPLETED DATE/TIME:  6/6/2019 7:24 am



REASON FOR STUDY:  UNSPEC ABD PAIN (R10.9), S/P COLON RESECTION (Z90.49) R10.9  UNSPECIFIED ABDOMINAL
 PAIN



COMPARISON:  None.



TECHNIQUE:  CT scan of the abdomen and pelvis performed without intravenous or oral contrast. Images 
reviewed with lung, soft tissue, and bone windows. Reconstructed coronal and sagittal MPR images revi
ewed. All images stored on PACS.

All CT scanners at this facility use dose modulation, iterative reconstruction, and/or weight based d
osing when appropriate to reduce radiation dose to as low as reasonably achievable (ALARA).

[facility]: Dose Right  [facility]: CareDose    [facility]: Dose Right    [facility]: Teradose 4D    [facility]: Smart Technologies



RADIATION DOSE:  CT Rad equipment meets quality standard of care and radiation dose reduction techniq
ues were employed. CTDIvol: 6.5 mGy. DLP: 338 mGy-cm.mGy.



LIMITATIONS:  None.



FINDINGS:  LOWER CHEST: No significant findings. No nodules or infiltrates.

NON-CONTRASTED LIVER, SPLEEN, ADRENALS: Evaluation limited by lack of IV contrast. No identified sign
ificant masses.

PANCREAS: No masses. No peripancreatic inflammatory changes.

GALLBLADDER: No identified stones by CT criteria. No inflammatory changes to suggest cholecystitis.

RIGHT KIDNEY AND URETER: No suspicious masses. Assessment limited by lack of IV contrast.   No signif
icant calcifications.   No hydronephrosis or hydroureter.

LEFT KIDNEY AND URETER: No suspicious masses. Assessment limited by lack of IV contrast.   No signifi
cant calcifications.   No hydronephrosis or hydroureter.

AORTA AND RETROPERITONEUM: No aneurysm. No retroperitoneal masses or adenopathy.

BOWEL AND PERITONEAL CAVITY: Surgical anastomosis at the junction of the descending and sigmoid colon
.  No obvious masses or inflammatory changes. No free fluid.

APPENDIX: Normal.

PELVIS, BLADDER, AND ABDOMINAL WALL:No abnormal masses. No free fluid. Bladder normal.

BONES: No significant findings.

OTHER: No other significant finding.



IMPRESSION:  NO SIGNIFICANT OR ACUTE PROCESS IN THE ABDOMEN OR PELVIS.  PREVIOUS COLON SURGERY.



COMMENT:  Quality ID # 436: Final reports with documentation of one or more dose reduction techniques
 (e.g., Automated exposure control, adjustment of the mA and/or kV according to patient size, use of 
iterative reconstruction technique)



TECHNICAL DOCUMENTATION:  JOB ID:  [id number]

 2011 [employer]- All Rights Reserved



Reading location - IP/workstation name: [email or web address]

## 2021-01-07 ENCOUNTER — HOSPITAL ENCOUNTER (EMERGENCY)
Dept: HOSPITAL 62 - ER | Age: 46
Discharge: HOME | End: 2021-01-07
Payer: MEDICAID

## 2021-01-07 VITALS — DIASTOLIC BLOOD PRESSURE: 86 MMHG | SYSTOLIC BLOOD PRESSURE: 134 MMHG

## 2021-01-07 DIAGNOSIS — K76.9: ICD-10-CM

## 2021-01-07 DIAGNOSIS — Z79.891: ICD-10-CM

## 2021-01-07 DIAGNOSIS — Z79.01: ICD-10-CM

## 2021-01-07 DIAGNOSIS — R10.84: ICD-10-CM
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
## 2021-01-07 DIAGNOSIS — S82.91XD: ICD-10-CM

## 2021-01-07 DIAGNOSIS — R06.00: ICD-10-CM

## 2021-01-07 DIAGNOSIS — Z88.0: ICD-10-CM

## 2021-01-07 DIAGNOSIS — Z98.890: ICD-10-CM

## 2021-01-07 DIAGNOSIS — R11.10: ICD-10-CM

## 2021-01-07 DIAGNOSIS — K56.600: Primary | ICD-10-CM

## 2021-01-07 DIAGNOSIS — Z90.49: ICD-10-CM

## 2021-01-07 DIAGNOSIS — Z87.891: ICD-10-CM

## 2021-01-07 DIAGNOSIS — S82.92XD: ICD-10-CM

## 2021-01-07 LAB
ADD MANUAL DIFF: NO
ALBUMIN SERPL-MCNC: 4.5 G/DL (ref 3.5–5)
ALP SERPL-CCNC: 165 U/L (ref 38–126)
ANION GAP SERPL CALC-SCNC: 10 MMOL/L (ref 5–19)
APPEARANCE UR: (no result)
APTT PPP: (no result) S
AST SERPL-CCNC: 26 U/L (ref 17–59)
BARBITURATES UR QL SCN: NEGATIVE
BASOPHILS # BLD AUTO: 0.1 10^3/UL (ref 0–0.2)
BASOPHILS NFR BLD AUTO: 0.7 % (ref 0–2)
BILIRUB DIRECT SERPL-MCNC: 0.3 MG/DL (ref 0–0.4)
BILIRUB SERPL-MCNC: 1.1 MG/DL (ref 0.2–1.3)
BILIRUB UR QL STRIP: NEGATIVE
BUN SERPL-MCNC: 14 MG/DL (ref 7–20)
CALCIUM: 10.9 MG/DL (ref 8.4–10.2)
CHLORIDE SERPL-SCNC: 100 MMOL/L (ref 98–107)
CO2 SERPL-SCNC: 30 MMOL/L (ref 22–30)
EOSINOPHIL # BLD AUTO: 0 10^3/UL (ref 0–0.6)
EOSINOPHIL NFR BLD AUTO: 0.5 % (ref 0–6)
ERYTHROCYTE [DISTWIDTH] IN BLOOD BY AUTOMATED COUNT: 15.4 % (ref 11.5–14)
GLUCOSE SERPL-MCNC: 134 MG/DL (ref 75–110)
GLUCOSE UR STRIP-MCNC: NEGATIVE MG/DL
HCT VFR BLD CALC: 43.5 % (ref 37.9–51)
HGB BLD-MCNC: 14.8 G/DL (ref 13.5–17)
KETONES UR STRIP-MCNC: NEGATIVE MG/DL
LYMPHOCYTES # BLD AUTO: 1 10^3/UL (ref 0.5–4.7)
LYMPHOCYTES NFR BLD AUTO: 12 % (ref 13–45)
MCH RBC QN AUTO: 30.6 PG (ref 27–33.4)
MCHC RBC AUTO-ENTMCNC: 34.1 G/DL (ref 32–36)
MCV RBC AUTO: 90 FL (ref 80–97)
METHADONE UR QL SCN: NEGATIVE
MONOCYTES # BLD AUTO: 0.5 10^3/UL (ref 0.1–1.4)
MONOCYTES NFR BLD AUTO: 5.6 % (ref 3–13)
NEUTROPHILS # BLD AUTO: 6.6 10^3/UL (ref 1.7–8.2)
NEUTS SEG NFR BLD AUTO: 81.2 % (ref 42–78)
NITRITE UR QL STRIP: NEGATIVE
PCP UR QL SCN: NEGATIVE
PH UR STRIP: 7 [PH] (ref 5–9)
PLATELET # BLD: 210 10^3/UL (ref 150–450)
POTASSIUM SERPL-SCNC: 4.1 MMOL/L (ref 3.6–5)
PROT SERPL-MCNC: 8 G/DL (ref 6.3–8.2)
PROT UR STRIP-MCNC: 30 MG/DL
RBC # BLD AUTO: 4.85 10^6/UL (ref 4.35–5.55)
SP GR UR STRIP: 1.02
TOTAL CELLS COUNTED % (AUTO): 100 %
URINE AMPHETAMINES SCREEN: NEGATIVE
URINE BENZODIAZEPINES SCREEN: NEGATIVE
URINE COCAINE SCREEN: NEGATIVE
URINE MARIJUANA (THC) SCREEN: NEGATIVE
UROBILINOGEN UR-MCNC: NEGATIVE MG/DL (ref ?–2)
WBC # BLD AUTO: 8.1 10^3/UL (ref 4–10.5)

## 2021-01-07 PROCEDURE — 99285 EMERGENCY DEPT VISIT HI MDM: CPT

## 2021-01-07 PROCEDURE — 96375 TX/PRO/DX INJ NEW DRUG ADDON: CPT

## 2021-01-07 PROCEDURE — 74176 CT ABD & PELVIS W/O CONTRAST: CPT

## 2021-01-07 PROCEDURE — 96361 HYDRATE IV INFUSION ADD-ON: CPT

## 2021-01-07 PROCEDURE — 80307 DRUG TEST PRSMV CHEM ANLYZR: CPT

## 2021-01-07 PROCEDURE — 85025 COMPLETE CBC W/AUTO DIFF WBC: CPT

## 2021-01-07 PROCEDURE — 96374 THER/PROPH/DIAG INJ IV PUSH: CPT

## 2021-01-07 PROCEDURE — S0119 ONDANSETRON 4 MG: HCPCS

## 2021-01-07 PROCEDURE — 96372 THER/PROPH/DIAG INJ SC/IM: CPT

## 2021-01-07 PROCEDURE — 36415 COLL VENOUS BLD VENIPUNCTURE: CPT

## 2021-01-07 PROCEDURE — 80053 COMPREHEN METABOLIC PANEL: CPT

## 2021-01-07 PROCEDURE — 71045 X-RAY EXAM CHEST 1 VIEW: CPT

## 2021-01-07 PROCEDURE — 81001 URINALYSIS AUTO W/SCOPE: CPT

## 2021-01-07 PROCEDURE — 76700 US EXAM ABDOM COMPLETE: CPT

## 2021-01-07 NOTE — RADIOLOGY REPORT (SQ)
EXAM DESCRIPTION:  CT ABD/PELVIS ORAL ONLY



IMAGES COMPLETED DATE/TIME:  1/7/2021 11:05 am



REASON FOR STUDY:  LLQ pain



COMPARISON:  6/6/2019.



TECHNIQUE:  CT scan of the abdomen and pelvis performed without intravenous or oral contrast. Images 
reviewed with lung, soft tissue, and bone windows. Reconstructed coronal and sagittal MPR images revi
ewed. All images stored on PACS.

All CT scanners at this facility use dose modulation, iterative reconstruction, and/or weight based d
osing when appropriate to reduce radiation dose to as low as reasonably achievable (ALARA).

[facility]: Dose Right  [facility]: CareDose    [facility]: Dose Right    [facility]: Teradose 4D    [facility]: Smart [employer]



RADIATION DOSE:  CT Rad equipment meets quality standard of care and radiation dose reduction techniq
ues were employed. CTDIvol: 6.4 mGy. DLP: 377 mGy-cm.mGy.



LIMITATIONS:  None.



FINDINGS:  LOWER CHEST: Faint density in the posterior lung bases.

NON-CONTRASTED LIVER, SPLEEN, ADRENALS: Evaluation limited by lack of IV contrast.  5 cm low-attenuat
ion mass in the posterior right lobe.

PANCREAS: No masses. No peripancreatic inflammatory changes.

GALLBLADDER: No identified stones by CT criteria. No inflammatory changes to suggest cholecystitis.

RIGHT KIDNEY AND URETER: No suspicious masses. Assessment limited by lack of IV contrast.   No signif
icant calcifications.   No hydronephrosis or hydroureter.

LEFT KIDNEY AND URETER: No suspicious masses. Assessment limited by lack of IV contrast.   No signifi
cant calcifications.   No hydronephrosis or hydroureter.

AORTA AND RETROPERITONEUM: No aneurysm. No retroperitoneal masses or adenopathy.

BOWEL AND PERITONEAL CAVITY: Contrast is present in the stomach and small bowel.  There is dilation o
f the proximal small bowel.  Stool in the colon and surgical changes in the rectosigmoid.

APPENDIX: Not visualized.

PELVIS, BLADDER, AND ABDOMINAL WALL:No abnormal masses.  Probable small amount of free fluid. Bladder
 normal.

BONES: No significant findings.

OTHER: No other significant finding.



IMPRESSION:

1. DILATED PROXIMAL SMALL BOWEL, SOMEWHAT CONCERNING FOR SMALL BOWEL OBSTRUCTION.  EXACT TRANSITION P
OINT IS NOT APPARENT.

2. 5 CM LOW-ATTENUATION LESION IN THE RIGHT LOBE OF THE LIVER.  POSSIBLE ETIOLOGIES INCLUDE SOLID MAS
S, COMPLEX CYST, OR ABSCESS.  RECOMMEND FURTHER EVALUATION WITH ULTRASOUND.

3. FAINT DENSITIES IN THE POSTERIOR LUNG BASES, NONSPECIFIC.  MAY BE DUE TO ATELECTASIS OR INFECTION.


4. NO OTHER SIGNIFICANT OR ACUTE PROCESS IN THE ABDOMEN OR PELVIS.



COMMENT:  Quality ID # 436: Final reports with documentation of one or more dose reduction techniques
 (e.g., Automated exposure control, adjustment of the mA and/or kV according to patient size, use of 
iterative reconstruction technique)



TECHNICAL DOCUMENTATION:  JOB ID:  [id number]

 2011 [employer]- All Rights Reserved



Reading location - IP/workstation name: [email or web address]

## 2021-01-07 NOTE — RADIOLOGY REPORT (SQ)
EXAM DESCRIPTION:  U/S ABDOMEN COMPLETE W/O DOP



IMAGES COMPLETED DATE/TIME:  1/7/2021 1:15 pm



REASON FOR STUDY:  Hepatic cyst/abscess?  On CT



COMPARISON:  CT 1/7/2021



TECHNIQUE:  Dynamic and static grayscale images acquired of the abdomen and recorded on PACS. Additio
nal selected color Doppler and spectral images recorded.

Note:  Study does not meet criteria for complete doppler/duplex scan



LIMITATIONS:  None.



FINDINGS:  PANCREAS: No mass in the head.  Poorly seen body and tail.

LIVER: Echotexture normal.  There is a 5.6 x 5.3 x 4 cm complex areas seen in the right lobe.

LIVER VASCULATURE: Normal directional flow of the main portal vein and hepatic veins.

GALLBLADDER: No stones. Normal wall thickness. No pericholecystic fluid.

ULTRASOUND-DETECTED JACKSON'S SIGN: Negative.

INTRAHEPATIC DUCTS AND COMMON DUCT: CBD and intrahepatic ducts normal caliber. No filling defects.

INFERIOR VENA CAVA: Normal flow.

AORTA: The mid and distal abdominal aorta were obscured by gas.

RIGHT KIDNEY:  Normal size, 13.7 cm   Normal echogenicity.   No solid or suspicious masses.   No hydr
onephrosis.   No calcifications.

LEFT KIDNEY:  Normal size, 11.6 cm.   Normal echogenicity.   No solid or suspicious masses.   No hydr
onephrosis.   No calcifications.

SPLEEN: Normal size. No solid masses.

PERITONEAL AND PLEURAL SPACES: No ascites or effusions.

OTHER: No other significant finding.



IMPRESSION:  There is a complex 5.3 x 5.6 x 4 cm area seen in the right lobe of the liver suggestive 
of an abscess.



TECHNICAL DOCUMENTATION:  JOB ID:  [id number]

 2011 [employer]- All Rights Reserved



Reading location - IP/workstation name: [email or web address]
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
## 2021-01-07 NOTE — RADIOLOGY REPORT (SQ)
EXAM DESCRIPTION:  CHEST SINGLE VIEW



IMAGES COMPLETED DATE/TIME:  1/7/2021 11:00 am



REASON FOR STUDY:  dyspnea



COMPARISON:  None.



EXAM PARAMETERS:  NUMBER OF VIEWS: One view.

TECHNIQUE: Single frontal radiographic view of the chest acquired.

RADIATION DOSE: NA

LIMITATIONS: None.



FINDINGS:  LUNGS AND PLEURA: No opacities, masses or pneumothorax. No pleural effusion.

MEDIASTINUM AND HILAR STRUCTURES: No masses.  Contour normal.

HEART AND VASCULAR STRUCTURES: Heart normal in size.  Normal vasculature.

BONES: No acute findings.

HARDWARE: None in the chest.

OTHER: No other significant finding.



IMPRESSION:  NO ACUTE RADIOGRAPHIC FINDING IN THE CHEST.



TECHNICAL DOCUMENTATION:  JOB ID:  [id number]

 2011 [employer] All Rights Reserved



Reading location - IP/workstation name: [email or web address]

## 2021-01-20 ENCOUNTER — HOSPITAL ENCOUNTER (OUTPATIENT)
Dept: HOSPITAL 62 - ER | Age: 46
Setting detail: OBSERVATION
LOS: 1 days | Discharge: HOME | End: 2021-01-21
Payer: COMMERCIAL

## 2021-01-20 DIAGNOSIS — T84.111A: ICD-10-CM
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
## 2021-01-20 DIAGNOSIS — Z20.822: ICD-10-CM
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
## 2021-01-20 DIAGNOSIS — S42.322K: Primary | ICD-10-CM

## 2021-01-20 DIAGNOSIS — Z01.812: ICD-10-CM

## 2021-01-20 DIAGNOSIS — Z99.3: ICD-10-CM

## 2021-01-20 DIAGNOSIS — F17.200: ICD-10-CM

## 2021-01-20 LAB
ADD MANUAL DIFF: NO
ALBUMIN SERPL-MCNC: 3.7 G/DL (ref 3.5–5)
ALP SERPL-CCNC: 107 U/L (ref 38–126)
ANION GAP SERPL CALC-SCNC: 5 MMOL/L (ref 5–19)
AST SERPL-CCNC: 21 U/L (ref 17–59)
BASOPHILS # BLD AUTO: 0 10^3/UL (ref 0–0.2)
BASOPHILS NFR BLD AUTO: 0.8 % (ref 0–2)
BILIRUB DIRECT SERPL-MCNC: 0.2 MG/DL (ref 0–0.4)
BILIRUB SERPL-MCNC: 0.4 MG/DL (ref 0.2–1.3)
BUN SERPL-MCNC: 16 MG/DL (ref 7–20)
CALCIUM: 9.5 MG/DL (ref 8.4–10.2)
CHLORIDE SERPL-SCNC: 104 MMOL/L (ref 98–107)
CO2 SERPL-SCNC: 30 MMOL/L (ref 22–30)
EOSINOPHIL # BLD AUTO: 0.2 10^3/UL (ref 0–0.6)
EOSINOPHIL NFR BLD AUTO: 3 % (ref 0–6)
ERYTHROCYTE [DISTWIDTH] IN BLOOD BY AUTOMATED COUNT: 14.2 % (ref 11.5–14)
GLUCOSE SERPL-MCNC: 117 MG/DL (ref 75–110)
HCT VFR BLD CALC: 39.2 % (ref 37.9–51)
HGB BLD-MCNC: 13.2 G/DL (ref 13.5–17)
LYMPHOCYTES # BLD AUTO: 1.6 10^3/UL (ref 0.5–4.7)
LYMPHOCYTES NFR BLD AUTO: 28.3 % (ref 13–45)
MCH RBC QN AUTO: 30.2 PG (ref 27–33.4)
MCHC RBC AUTO-ENTMCNC: 33.8 G/DL (ref 32–36)
MCV RBC AUTO: 89 FL (ref 80–97)
MONOCYTES # BLD AUTO: 0.5 10^3/UL (ref 0.1–1.4)
MONOCYTES NFR BLD AUTO: 8.9 % (ref 3–13)
NEUTROPHILS # BLD AUTO: 3.4 10^3/UL (ref 1.7–8.2)
NEUTS SEG NFR BLD AUTO: 59 % (ref 42–78)
PLATELET # BLD: 189 10^3/UL (ref 150–450)
POTASSIUM SERPL-SCNC: 3.8 MMOL/L (ref 3.6–5)
PROT SERPL-MCNC: 6.4 G/DL (ref 6.3–8.2)
RBC # BLD AUTO: 4.39 10^6/UL (ref 4.35–5.55)
TOTAL CELLS COUNTED % (AUTO): 100 %
WBC # BLD AUTO: 5.8 10^3/UL (ref 4–10.5)

## 2021-01-20 PROCEDURE — 93005 ELECTROCARDIOGRAM TRACING: CPT

## 2021-01-20 PROCEDURE — 24435 RPR NON/MAL HUM WITH AGRFT: CPT

## 2021-01-20 PROCEDURE — 80053 COMPREHEN METABOLIC PANEL: CPT

## 2021-01-20 PROCEDURE — 36415 COLL VENOUS BLD VENIPUNCTURE: CPT

## 2021-01-20 PROCEDURE — 87205 SMEAR GRAM STAIN: CPT

## 2021-01-20 PROCEDURE — 93010 ELECTROCARDIOGRAM REPORT: CPT

## 2021-01-20 PROCEDURE — 71045 X-RAY EXAM CHEST 1 VIEW: CPT

## 2021-01-20 PROCEDURE — 88305 TISSUE EXAM BY PATHOLOGIST: CPT

## 2021-01-20 PROCEDURE — C1713 ANCHOR/SCREW BN/BN,TIS/BN: HCPCS

## 2021-01-20 PROCEDURE — 85025 COMPLETE CBC W/AUTO DIFF WBC: CPT

## 2021-01-20 PROCEDURE — 87075 CULTR BACTERIA EXCEPT BLOOD: CPT

## 2021-01-20 PROCEDURE — G0378 HOSPITAL OBSERVATION PER HR: HCPCS

## 2021-01-20 PROCEDURE — 87070 CULTURE OTHR SPECIMN AEROBIC: CPT

## 2021-01-20 PROCEDURE — S0119 ONDANSETRON 4 MG: HCPCS

## 2021-01-20 PROCEDURE — 99285 EMERGENCY DEPT VISIT HI MDM: CPT

## 2021-01-20 PROCEDURE — 88311 DECALCIFY TISSUE: CPT

## 2021-01-20 PROCEDURE — 01744 ANES OPN/ARTHRS ELBW RPR HUM: CPT

## 2021-01-20 PROCEDURE — 73060 X-RAY EXAM OF HUMERUS: CPT

## 2021-01-20 PROCEDURE — C9803 HOPD COVID-19 SPEC COLLECT: HCPCS

## 2021-01-20 PROCEDURE — 0241U: CPT

## 2021-01-20 PROCEDURE — 81001 URINALYSIS AUTO W/SCOPE: CPT

## 2021-01-20 RX ADMIN — ACETAMINOPHEN SCH: 325 TABLET ORAL at 22:01

## 2021-01-20 RX ADMIN — OXYCODONE HYDROCHLORIDE PRN MG: 5 TABLET ORAL at 21:40

## 2021-01-20 RX ADMIN — MORPHINE SULFATE PRN MG: 10 INJECTION INTRAMUSCULAR; INTRAVENOUS; SUBCUTANEOUS at 23:11
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
## 2021-01-20 NOTE — EKG REPORT
SEVERITY:- ABNORMAL ECG -

SINUS RHYTHM

RIGHT BUNDLE BRANCH BLOCK

:

Confirmed by: Ray Pavon MD 20-Jan-2021 19:26:05

## 2021-01-20 NOTE — RADIOLOGY REPORT (SQ)
EXAM DESCRIPTION:  CHEST SINGLE VIEW



IMAGES COMPLETED DATE/TIME:  1/20/2021 6:19 pm



REASON FOR STUDY:  Preop chest x-ray



COMPARISON:  1/7/2021



EXAM PARAMETERS:  NUMBER OF VIEWS: One view.

TECHNIQUE: Single frontal radiographic view of the chest acquired.

RADIATION DOSE: NA

LIMITATIONS: None.



FINDINGS:  LUNGS AND PLEURA: No opacities, masses or pneumothorax. No pleural effusion.

MEDIASTINUM AND HILAR STRUCTURES: No masses.  Contour normal.

HEART AND VASCULAR STRUCTURES: Heart normal in size.  Normal vasculature.

BONES: No acute findings.

HARDWARE: None in the chest.

OTHER: No other significant finding.



IMPRESSION:  NO ACUTE RADIOGRAPHIC FINDING IN THE CHEST.



TECHNICAL DOCUMENTATION:  JOB ID:  [id number]

 2011 [employer]- All Rights Reserved



Reading location - IP/workstation name: [email or web address]
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
## 2021-01-20 NOTE — XMS REPORT
Patient Summary Document

                           Created on:2021



Patient:SABAS HOGUE

Sex:Male

:1975

External Reference #:[id number]





Demographics







                          Address                   [address] [address]

 

                          Home Phone                [phone]

 

                          Mobile Phone              [phone]

 

                          Email Address             [email or web address]

 

                          Preferred Language        English

 

                          Marital Status            Unknown

 

                          Yazidi Affiliation     Unknown

 

                          Race                      Unknown

 

                          Additional Race(s)        White

 

                          Ethnic Group              Unknown









Author







                          Organization              [facility]

 

                          Address                   [address] [address]

 

                          Phone                     [phone]









Care Team Providers







                    Name                Role                Phone

 

                    Unavailable         Unavailable         Unavailable









Allergies, Adverse Reactions, Alerts







        Allergy Name Allergy Status  Severity Reaction(s) Onset   Inactive Treat

ing 

Comments



                Type                            Date    Date    Clinician 

 

        Iodine  Allergy to Active                                          



                substance                                                 

 

        Iodinated Allergy to Active          Facial                          



        Contrast substance                 swelling                         



        Media                                                           

 

        Penicillins Allergy to Active          Facial                          



                substance                 swelling                         

 

        Shellfish Allergy to Active          Facial                          



        Derived substance                 swelling                         







Medications







       Ordered Filled Start  Stop   Current Ordering Indication Dosage Frequency

 Signature

                          Comments                  Components



      Medication Medication Date  Date  Medication? Clinician                   

(SIG)       



      Name  Name                                                        

 

      ciprofloxac                   No                            ciprofloxa    

   



      in 250 mg                                                 raquel 250 mg      

 



      tablet TAKE                                                 tablet       



      3 TABLETS                                                 TAKE 3       



      BY MOUTH                                                 TABLETS BY       



      TWICE DAILY                                                 MOUTH       



                                                            TWICE       



                                                            DAILY       

 

       mg                   No                             mg     

  



      capsule                                                 capsule       

 

      enoxaparin                   No                            enoxaparin     

  



      80 mg/0.8                                                 80 mg/0.8       



      mL                                                    mL          



      subcutaneou                                                 subcutaneo    

   



      s syringe                                                 us syringe      

 



      INJECT 1                                                 INJECT 1       



      SYRINGE                                                 SYRINGE       



      EVERY 12                                                 EVERY 12       



      HOURS                                                 HOURS       



      SUBCUTANESO                                                 SUBCUTANES    

   



      USLY FOR 30                                                 OUSLY FOR     

  



      DAYS                                                  30 DAYS       

 

      famotidine                   No                1     BID   famotidine     

  



      20 mg                                                 20 mg       



      tablet Take                                                 tablet       



      1 tablet                                                 Take 1       



      twice a day                                                 tablet       



      by oral                                                 twice a       



      route.                                                 day by       



                                                            oral        



                                                            route.       

 

      fluconazole                   No                            fluconazol    

   



      200 mg                                                 e 200 mg       



      tablet TAKE                                                 tablet       



      1 TABLET BY                                                 TAKE 1       



      MOUTH ONCE                                                 TABLET BY      

 



      DAILY                                                 MOUTH ONCE       



                                                            DAILY       

 

      omeprazole                   No                            omeprazole     

  



      40 mg                                                 40 mg       



      capsule,del                                                 capsule,de    

   



      ayed                                                  layed       



      release                                                 release       



      TAKE 1                                                 TAKE 1       



      CAPSULE BY                                                 CAPSULE BY     

  



      MOUTH ONCE                                                 MOUTH ONCE     

  



      DAILY FOR                                                 DAILY FOR       



      90 DAYS                                                 90 DAYS       

 

      oxycodone-a                   No                            oxycodone-    

   



      cetaminophe                                                 acetaminop    

   



      n 7.5                                                 hen 7.5       



      mg-325 mg                                                 mg-325 mg       



      tablet TAKE                                                 tablet       



      1 TABLET BY                                                 TAKE 1       



      MOUTH EVERY                                                 TABLET BY     

  



      6 HOURS AS                                                 MOUTH       



      NEEDED FOR                                                 EVERY 6       



      5 DAYS                                                 HOURS AS       



                                                            NEEDED FOR       



                                                            5 DAYS       

 

      Santyl 250                   No                            Santyl 250     

  



      unit/gram                                                 unit/gram       



      topical                                                 topical       



      ointment                                                 ointment       



      APPLY TO                                                 APPLY TO       



      THE                                                   THE         



      AFFECTED                                                 AFFECTED       



      AREA(S)                                                 AREA(S)       



      AFTER                                                 AFTER       



      CLEANSING                                                 CLEANSING       



      EVERY DAY                                                 EVERY DAY       

 

      senna 8.6                   No                            senna 8.6       



      mg tablet                                                 mg tablet       

 

      enoxaparin                   No                            enoxaparin     

  

 

      enoxaparin                   No                1mL   Q12H  enoxaparin     

  



      100 mg/mL                                                 100 mg/mL       



      subcutaneou                                                 subcutaneo    

   



      s syringe                                                 us syringe      

 



      Inject 1 mL                                                 Inject 1      

 



      every 12                                                 mL every       



      hours by                                                 12 hours       



      subcutaneou                                                 by          



      s route for                                                 subcutaneo    

   



      30 days.                                                 us route       



                                                            for 30       



                                                            days.       

 

      Pepcid                   No                            Pepcid       

 

      senna                   No                            senna       







Problems







        Condition Condition Condition Status  Onset   Resolution Last    Treatin

g Comments



        Name    Details Category         Date    Date    Treatment Clinician 



                                                        Date            

 

        Fracture of Fracture of Problem Active                            



        ankle   Ankle                                               



                                        00:00:                          



                                        00                              

 

        Diverticuli Diverticuli Problem Active                            



        tis     tis                                                 



                                        00:00:                          



                                        00                              

 

        Fracture of Fracture of Problem Active  2020                          



        humerus Humerus                                             



                                        00:00:                          



                                        00                              

 

        Fracture of Fracture of Problem Active  2020                          



        femur   Femur                                               



                                        00:00:                          



                                        00                              

 

        Body mass Body Mass Problem Active                            



        index 25-29 Index 25-29                 3-03                            



        -       -                       00:00:                          



        overweight Overweight                 00                              

 

        Multiple Multiple Problem Active                            



        joint pain Joint Pain                 7                            



                                        00:00:                          



                                        00                              

 

        Elevated Elevated Problem Active                            



        blood-press Blood-press                 7-25                            



        ure reading ure Reading                 00:00:                          



        without without                 00                              



        diagnosis Diagnosis                                                 



        of      of                                                      



        hypertensio Hypertensio                                                 



        n       n                                                       

 

        Hyperlipide Hyperlipide Problem Active                            



        jono     jono                     1-                            



                                        00:00:                          



                                        00                              

 

        History of History of Problem Active                            



        diverticuli Diverticuli                 1-                            



        tis     tis                     00:00:                          



                                        00                              

 

        Chronic Chronic Problem Active                            



        alcoholism Alcoholism                 1-06                            



        in      in                      00:00:                          



        remission Remission                 00                              

 

        Nicotine Nicotine Problem Active                            



        dependence Dependence                 1                            



                                        00:00:                          



                                        00                              







Procedures







                Procedure       Date / Time Performed Performing Clinician Devic

e

 

                RADIOGRAPHIC EXAM FEMUR 2 VIEWS 2021 00:00:00             

    



                MINIMUM                                         

 

                RADIOLOGIC EXAM ANKLE 3 VIEWS 2021 00:00:00               

  

 

                RADIOLOGIC EXAM HUMERUS 2 VIEWS 2021 00:00:00             

    

 

                pulse oximetry (PROC) 2021 00:00:00                 

 

                pulse oximetry (PROC) 2020 00:00:00                 

 

                Other           2017-10-23 00:00:00                 

 

                Hernia Repair                                   

 

                Open Reduction of Fracture of                                 



                Humerus with Internal Fixation                                 

 

                Open Reduction of Fracture of Femur                             

    



                with Internal Fixation                                 

 

                Open Reduction of Fracture of Ankle                             

    



                with Internal Fixation                                 







Results

This patient has no known results.



Assessments







                Condition Name  Status          Diagnosis Date  Treating Clinici

an

 

                Fracture of shaft of femur Active          2021 21:02:59 

 

                Closed fracture of shaft of femur Active          2021 21:

03:15 

 

                Closed bimalleolar fracture of right Active          2021 

09:35:53 



                ankle                                           

 

                Closed fracture of shaft of humerus Active          2021 0

9:36:09 

 

                Small bowel obstruction Active          2021 16:50:14 

 

                Hoarse          Active          2021 16:50:32 

 

                Community acquired pneumonia Active          2021 04:54:57

 

 

                Dyspnea         Active          2021 04:54:57 

 

                Traumatic brain injury Active          2021 04:54:57 

 

                Contusion of lung Active          2021 04:54:57 

 

                Fracture of rib Active          2021 04:54:57 

 

                Laceration of liver Active          2021 04:54:57 

 

                Adrenal hemorrhage Active          2021 04:54:57 

 

                Closed fracture of shaft of femur Active          2021 04:

54:57 

 

                Closed fracture of lateral malleolus Active          2021 

04:54:57 

 

                Closed fracture of shaft of humerus Active          2021 0

4:54:57 

 

                Traumatic pubic symphysis separation Active          2021 

04:54:57 

 

                History of blood transfusion Active          2021 04:54:57

 

 

                Anemia          Active          2021 04:54:57 

 

                Thrombocytopenic disorder Active          2021 04:54:57 

 

                Acute kidney injury due to trauma Active          2021 04:

54:57 

 

                Deep venous thrombosis of lower Active          2021 04:54

:57 



                extremity                                       

 

                Syndrome of inappropriate vasopressin Active          2021

 04:54:57 



                secretion                                       

 

                Psychotic disorder Active          2021 04:54:57 

 

                Elevated blood-pressure reading without Active           04:54:57 



                diagnosis of hypertension                                 

 

                Gastroesophageal reflux disease without Active           04:54:57 



                esophagitis                                     

 

                Constipation    Active          2021 04:54:57 

 

                Nicotine dependence Active          2021 04:54:57 

 

                Closed fracture of tibial plateau Active          2021 17:

08:09 

 

                Referred by hospital doctor Active          2020 06:59:45 

 

                Hospital inpatient stay within past 30 Active          2020-12-2

8 06:59:48 



                days                                            

 

                Community acquired pneumonia Active          2020 06:59:59

 

 

                Dyspnea         Active          2020 06:59:59 

 

                Traumatic brain injury Active          2020 17:38:22 

 

                Contusion of lung Active          2020 17:38:31 

 

                Fracture of rib Active          2020 17:38:47 

 

                Laceration of liver Active          2020 17:38:56 

 

                Adrenal hemorrhage Active          2020 17:39:06 

 

                Closed fracture of shaft of femur Active          2020 17:

39:28 

 

                Closed fracture of lateral malleolus Active          2020 

17:39:53 

 

                Closed fracture of shaft of humerus Active          2020 1

7:40:04 

 

                Traumatic pubic symphysis separation Active          2020 

17:40:25 

 

                History of blood transfusion Active          2020 17:40:39

 

 

                Anemia          Active          2020 17:40:33 

 

                Thrombocytopenic disorder Active          2020 17:40:57 

 

                Acute kidney injury due to trauma Active          2020 17:

40:47 

 

                Deep venous thrombosis of lower Active          2020 17:41

:06 



                extremity                                       

 

                Syndrome of inappropriate vasopressin Active          2020

 17:41:17 



                secretion                                       

 

                Psychotic disorder Active          2020 17:41:45 

 

                Elevated blood-pressure reading without Active          2020-12-

28 17:41:49 



                diagnosis of hypertension                                 

 

                Gastroesophageal reflux disease without Active          2020-12-

29 13:02:17 



                esophagitis                                     

 

                Constipation    Active          2020 13:02:29 

 

                Nicotine dependence Active          2020 17:41:54 







Encounters







        Start   End     Encounter Admission Attending Care    Care    Encounter



        Date/Time Date/Time Type    Type    Clinicians Facility Department ID

 

        2021 Aneudy                  [facility] [id number]

02



        00:00:00 00:00:00 MD Lalo:                 [facility]

2



                        [address]                  [place], [place]                                  



                        [address]                                 



                        , Ph.                                   



                        [phone]                                 

 

        2021 Wes Silveira Jordana [id number]

02



        00:00:00 00:00:00 Ortiz                 [facility] [id number]



                        [facility] 



                        DO: [address]                                 



                        [facility],                                 



                        [address],                                 



                        [place], [place]                                  



                        [address]                                 



                        , Ph.                                   



                        [phone]                                 

 

        2020 Edward                  [facility] [id number]

02



        00:00:00 00:00:00 MD Lalo:                 [facility] [address]

8



                        [address]                  [facility] 



                        [facility],                                   



                        [place], [place]                                  



                        [address]                                 



                        , Ph.                                   



                        [phone]                                 







Plan of Treatment







                Planned Activity Planned Date    Details         Comments

 

                Future Appointment 2021 14:35:00 Wes Marcial, [address] [facility]; [address], [place], [place]

 



                                                [id number]      

 

                Future Appointment 2021 14:00:00 Aneudy May, [address] [facility]; , 



                                                [place] [address] 







Social History







                    Smoking Status      Start Date          Stop Date

 

                    Heavy Tobacco Smoker                     

 

                    Never Smoker                            







Vital Signs







                Vital Name      Observation Time Observation Value Comments

 

                Height          2021 00:00:00 71 [in_i]       

 

                BMI (Body Mass Index) 2021 00:00:00 25.8 kg/m2      

 

                BP Systolic     2021 00:00:00 109 mm[Hg]      

 

                Body Weight     2021 00:00:00 185 [lb_av]     

 

                BP Diastolic    2021 00:00:00 80 mm[Hg]       

 

                Height          2021 00:00:00 71 [in_i]       

 

                BMI (Body Mass Index) 2021 00:00:00 25.8 kg/m2      

 

                Body Weight     2021 00:00:00 185 [lb_av]     

 

                BP Diastolic    2020 00:00:00 81 mm[Hg]       

 

                Height          2020 00:00:00 71 [in_i]       

 

                BMI (Body Mass Index) 2020 00:00:00 25.8 kg/m2      

 

                BP Systolic     2020 00:00:00 135 mm[Hg]      

 

                Body Weight     2020 00:00:00 185 [lb_av]     







Hospital Discharge Instructions

1. Closed fracture of tibial plateau  rolling walker  home health referral
 - PT for B/L LE @ 50% weightbearing 2. Fracture of shaft of femur  XR, femur
 3. Closed fracture of shaft of femur XR, femur 4. Closed bimalleolar 
fracture of right ankle  XR, ankle, 3 or more view 5. Closed fracture of 
shaft of humerus  XR, humerus Discussion Note: None recorded. Patient 
educational handouts: No information available.1. Referred by hospital doctor 
 pulse oximetry (PROC) 2. Hospital inpatient stay within past 30 days 3. 
Community acquired pneumonia 4. Dyspnea 5. Traumatic brain injury 6. Contusion 
of lung 7. Fracture of rib 8. Laceration of liver 9. Adrenal hemorrhage 10. 
Closed fracture of shaft of femur  oxycodone-acetaminophen 7.5 mg-325 mg 
tablet  orthopedic referral - PLZ EVAL AND MAKE RECOMMENDATIONS  Physical 
and Occupational Therapy Referral - EVAL AND TREAT AD BINH  home health 
referral - EVAL AND TREAT AD BINH 11. Closed fracture of lateral malleolus  
orthopedic referral - EVAL AND TREAT AD BINH 12. Closed fracture of shaft of 
humerus  orthopedic referral - EVAL AND TREAT AD BINH 13. Traumatic pubic 
symphysis separation  orthopedic referral - EVAL AND TREAT AD BINH 14. History
 of blood transfusion 15. Anemia 16. Thrombocytopenic disorder 17. Acute kidney 
injury due to trauma 18. Deep venous thrombosis of lower extremity  vascular 
surgery referral - EVAL AND TREAT AD BINH  enoxaparin 100 mg/mL subcutaneous 
syringe 19. Syndrome of inappropriate vasopressin secretion 20. Psychotic 
disorder 21. Elevated blood-pressure reading without diagnosis of hypertension 
22. Gastroesophageal reflux disease without esophagitis  omeprazole 40 mg 
capsule,delayed release 23. Constipation 24. Nicotine dependence Discussion Note
 All pt. questions and concerns were addressed and answered. Pt. verbalized 
understanding and agreement of treatment plan. I spent up to 30 minutes of face 
to facetime with patient and &gt; 50% was spent in counseling and/or 
coordination of care. Quality measures/ USPSTF/AAFP screening guidelines and 
recommendations were reviewed and discussed and appropriate orders initiated if 
applicable. Patient educational handouts: No information available.

## 2021-01-21 VITALS — SYSTOLIC BLOOD PRESSURE: 153 MMHG | DIASTOLIC BLOOD PRESSURE: 98 MMHG

## 2021-01-21 LAB
APPEARANCE UR: (no result)
APTT PPP: YELLOW S
BILIRUB UR QL STRIP: NEGATIVE
GLUCOSE UR STRIP-MCNC: NEGATIVE MG/DL
KETONES UR STRIP-MCNC: NEGATIVE MG/DL
NITRITE UR QL STRIP: NEGATIVE
PH UR STRIP: 6 [PH] (ref 5–9)
PROT UR STRIP-MCNC: NEGATIVE MG/DL
SP GR UR STRIP: 1.02
UROBILINOGEN UR-MCNC: NEGATIVE MG/DL (ref ?–2)

## 2021-01-21 RX ADMIN — OXYCODONE HYDROCHLORIDE PRN MG: 5 TABLET ORAL at 03:11

## 2021-01-21 RX ADMIN — ACETAMINOPHEN SCH: 325 TABLET ORAL at 15:31

## 2021-01-21 RX ADMIN — ACETAMINOPHEN SCH: 325 TABLET ORAL at 07:04

## 2021-01-21 RX ADMIN — MORPHINE SULFATE PRN MG: 10 INJECTION INTRAMUSCULAR; INTRAVENOUS; SUBCUTANEOUS at 04:25

## 2021-01-21 NOTE — DISCHARGE SUMMARY
Discharge Summary (SDC)





- Discharge


Final Diagnosis: 





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


Date of Surgery: 01/21/21


Discharge Date: 01/21/21


Condition: Stable


Treatment or Instructions: 


Follow-up with Dr. Wes Marcial, orthopedic surgeon at [facility] for 

surgery, in 10 days.  Call for an appointment.  Telephone #[phone]. [phone], [place] [address]





Patient was provided with a paper prescription of oxycodone given that [facility] 

[facility] and [facility].  Follow the prescription

details, do not operate a motor vehicle while taking narcotics





I have also encouraged him to supplement his pain control with Tylenol.





He is to take a daily baby aspirin 81 mg





I encourage full range of motion of the elbow and shoulder.  He may wear a sling

for comfort.  He does depend on his upper extremity substantially given that he 

has bilateral lower extremity fractures and has difficulty with mobilization.  

Given this he is able to weight-bear on the left upper extremity immediately 

however I have encouraged him to limit his left upper extremity weightbearing to

50%.





5000 international units of vitamin D should be consumed daily.  I am currently 

awaiting a further metabolic work-up including PTH TSH and vitamin D as for a 

potential reason for his delayed union and need for recurrent surgery.





A sterile occlusive dressing is currently in place.  As long as this stays 

intact he may take a shower.  If it begins to come off he needs to avoid getting

the incision wet until approved by follow-up in the office.  He may reinforce 

his dressing with further waterproof dressings that he can find at the pharmacy.

 He may leave his dressing in place until seen in my office.


Referrals: 


ONEL MANZANO, FNP-C [Primary Care Provider] - Follow up as needed


Respiratory Treatments at Home: Deep Breathing/Coughing


Discharge Activity: Activity As Tolerated, No Driving, Slowly Increase Activity,

No tub bath


Activities Provided by Home Health Agency: Physical Therapy


Report the Following to Your Physician Immediately: Shortness of Breath, Fever 

over 101 Degrees, Unusual Bleeding, Drainage-Yellow

## 2021-01-21 NOTE — OPERATIVE REPORT
Operative Report


DATE OF SURGERY: 01/21/21


PREOPERATIVE DIAGNOSIS: Failure of hardware and recurrent left humeral shaft fr

acture.


POSTOPERATIVE DIAGNOSIS: Failure of hardware, oligotrophic left humeral shaft 

fracture nonunion.


OPERATION: Left humeral shaft fracture open reduction internal fixation revision

with removal of hardware and iliac crest bone marrow aspirate harvest


SURGEON: KATIE DOWLING JR


ANESTHESIA: GA


TISSUE REMOVED OR ALTERED: Cultures superficial and deep, and specimen sent for 

permanent pathology..


COMPLICATIONS: 





None


ESTIMATED BLOOD LOSS: 350 cc


PROCEDURE: 


The patient was brought into the operating suite and placed under general 

anesthesia on his hospital bed.  Following appropriate anesthesia, he was 

transferred to the operating table in supine position.  His left upper extremity

was then positioned on a hand table.  The patient was positioned with a bump 

under her shoulder and slight elevation of the head of the table.  The left 

upper extremity was then prepped and draped in standard sterile fashion as well 

as a area over the left iliac crest.





An appropriate timeout was performed followed by a small incision over the 

anterior iliac crest.  Just deep to the skin, blunt dissection was performed to 

bone with a hemostat.  After this a trocar was inserted for bone marrow aspirate

from the iliac crest.  This was pushed gently through the bone and advanced 

along the inner table of the iliac crest.  Upon achieving appropriate depth, a 

substantial amount of bone marrow aspirate was easily obtained, 40 cc.  Some 

gauze was placed over this wound until closure later in the case.





We turned our attention to the humerus.  The prior incision had developed a 

thickened keloid.  I excised this tissue by incising on either border of the 

keloid and gently removing it from the dermis.  After this, gentle dissection to

the fascia was performed with blunt dissection and spreading with a Metzenbaum 

scissor.  We were able to continue through the prior tissue planes performing an

approach to the humerus through the anterior lateral approach lateral to the 

biceps.  The biceps was reflected medially.  Gentle dissection through prior 

scar tissue was carefully performed in order to avoid any crossing vasculature 

or neurovascular structures.  Any crossing vasculature encountered was cau

terized.  The fracture was exposed and we carried our deep exposure along the 

visible plate both proximally and distally taking care to note any neurovascular

structures.  Cultures were taken of the plate and surrounding tissues with a 

culture swab and additionally cultures were sent in a specimen cup of some of 

the local tissues.  The radial nerve was found laterally and distally and was 

protected throughout the case.  I refrain from periosteal stripping but 

attempted to leave as much soft tissue intact.  The plate was fully exposed and 

the screws were removed followed by removal of the plate.  Further exploration 

of the fracture site revealed minimal callus formation in spite of what appeared

to be more abundant callus on prior x-rays.  Some of this tissue was also sent 

as a culture and specimen for permanent pathology.





The fracture site was exposed and the ends of the fracture were then prepared by

rongeuring away any nonviable appearing tissue as well as curetting the ends of 

the bone until some punctate bleeding was encountered.  Some surrounding callus 

formation and any viable appearing bone as well as bone was collected for later 

autograft.  After this the fracture was reduced and keyed in place.  A pointed 

reduction forcep was placed in order to obtain a provisional reduction.  A 4.0 

compression plate was placed anteriorly and a screw was drilled in the oblong 

hole proximally as well as the oblong hole distally.  This was done at the most 

peripheral aspect of each hole in order to allow for maximal compression.  

Screws were not completely seated and we then turned our attention to a larger 

plate.  At this point the wound was copiously irrigated with sterile saline 

solution.  The Hana V toss bone graft was mixed with our autogenic bone 

marrow aspirate.  This was then placed in and around the fracture site prior to 

application of the 5.0 plate.  A 5.0 plate was placed laterally on the humerus 

taking care to position it so that the ends of the plate were not the same level

of the prior plate.  The plate was held provisionally in place with a lobster 

claw.  The 4.0 compression screws were then tightened down in order to obtain 

provisional compression.  Returning to the 5.0 plate, again I drilled holes in 

the oblong options at the most peripheral aspect in order to obtain the most 

compression possible.  The screws were also tightened down again to obtain 

maximal compression.  A third oblong screw was drilled for compression and 

placed in the plate.  Finally 2 locking screws were inserted into this plate at 

the most peripheral locking screw holes.  The distal screw holes were also 

filled with nonlocking screws.  We returned to the 4.0 plate where completed our

stabilization by placing 3 nonlocking screws above and below the fracture.  

Fluoroscopy was used to check screw lengths.  2 screws were exchanged and a 4.0 

plate due to slightly long length.  Final fluoroscopy was then taken to ensure 

fracture reduction and appropriate hardware position.





All screws were then tightened down.  The wound was again copiously irrigated 

with sterile saline solution.  The remainder of our bone graft both Vitoss with 

autologous BMAC as well as autologous bone collected from the fracture site were

then placed about the fracture.  1 g of vancomycin was used in the deep tissues,

preserving a small amount to later use in the superficial tissue.  The deep 

fascia was closed with a running 2-0Quill.  The superficial layer then had the 

remainder of the vancomycin applied.  The dermal layer then was closed with a 

running 2-0 Quill.  I then sewed the skin together with a 3-0 nylon and 

horizontal mattress fashion.  A sterile dressing was then placed with Xeroform 4

x 4's and a honeycomb dressing followed by a gentle Ace wrap.  The hip wound was

irrigated with sterile saline and closed with 3-0 nylon followed by a sterile 

dressing.  The patient was then awakened from anesthesia and transferred the 

PACU in stable condition.

## 2021-01-21 NOTE — RADIOLOGY REPORT (SQ)
EXAM DESCRIPTION:  HUMERUS LEFT; NO CHG FLUORO



IMAGES COMPLETED DATE/TIME:  1/21/2021 10:38 am



REASON FOR STUDY:  ORIF LEFT HUMERUS ASSISTED WITH FLUORO IN OR



COMPARISON:  1/20/2021



FLUOROSCOPY TIME:  8 seconds

6 images saved to PACS.



TECHNIQUE:  Intra-operative images acquired during surgical procedure to evaluate progress.

NUMBER OF IMAGES: 6



LIMITATIONS:  None.



FINDINGS:  6 intraoperative fluoroscopic spot images were obtained had over the course of apparent op
en reduction, internal fixation revision of the left humerus.  Images are submitted for administrativ
e purposes only.  Please refer to the operative report for full details regarding this procedure.



IMPRESSION:  IMAGE(S) OBTAINED DURING PROCEDURE.



COMMENT:  Quality :  Final reports for procedures using fluoroscopy that document radiation exp
osure indices, or exposure time and number of fluorographic images (if radiation exposure indices are
 not available)

Please consult full operative report of the attending physician for description of the procedure.



TECHNICAL DOCUMENTATION:  JOB ID:  [id number]

 2011 [employer]- All Rights Reserved



Reading location - IP/workstation name: [email or web address]

## 2021-01-21 NOTE — RADIOLOGY REPORT (SQ)
EXAM DESCRIPTION:  HUMERUS LEFT; NO CHG FLUORO



IMAGES COMPLETED DATE/TIME:  1/21/2021 10:38 am



REASON FOR STUDY:  ORIF LEFT HUMERUS ASSISTED WITH FLUORO IN OR



COMPARISON:  1/20/2021



FLUOROSCOPY TIME:  8 seconds

6 images saved to PACS.



TECHNIQUE:  Intra-operative images acquired during surgical procedure to evaluate progress.

NUMBER OF IMAGES: 6



LIMITATIONS:  None.



FINDINGS:  6 intraoperative fluoroscopic spot images were obtained had over the course of apparent op
en reduction, internal fixation revision of the left humerus.  Images are submitted for administrativ
e purposes only.  Please refer to the operative report for full details regarding this procedure.



IMPRESSION:  IMAGE(S) OBTAINED DURING PROCEDURE.



COMMENT:  Quality :  Final reports for procedures using fluoroscopy that document radiation exp
osure indices, or exposure time and number of fluorographic images (if radiation exposure indices are
 not available)

Please consult full operative report of the attending physician for description of the procedure.



TECHNICAL DOCUMENTATION:  JOB ID:  [id number]

 2011 [employer]- All Rights Reserved



Reading location - IP/workstation name: [email or web address]

## 2021-01-21 NOTE — RADIOLOGY REPORT (SQ)
EXAM DESCRIPTION:  HUMERUS LEFT



IMAGES COMPLETED DATE/TIME:  1/21/2021 11:38 am



REASON FOR STUDY:  post op



COMPARISON:  1/20/2021.



NUMBER OF VIEWS:  Two views.



TECHNIQUE:  Two radiographic images were acquired of the left humerus to include elbow and shoulder i
n at least one projection.



LIMITATIONS:  None.



FINDINGS:  MINERALIZATION: Normal.

BONES: Post surgical fixation of the fracture of the humerus with hardware.  Satisfactory alignment.

SOFT TISSUES: Expected surgical changes in the soft tissues.

OTHER: No other significant finding.



IMPRESSION:  SATISFACTORY POSTOPERATIVE APPEARANCE.



TECHNICAL DOCUMENTATION:  JOB ID:  [id number]

 2011 [employer]- All Rights Reserved



Reading location - IP/workstation name: [email or web address]